# Patient Record
Sex: MALE | Race: WHITE | Employment: PART TIME | URBAN - METROPOLITAN AREA
[De-identification: names, ages, dates, MRNs, and addresses within clinical notes are randomized per-mention and may not be internally consistent; named-entity substitution may affect disease eponyms.]

---

## 2017-06-11 ENCOUNTER — APPOINTMENT (OUTPATIENT)
Dept: GENERAL RADIOLOGY | Age: 47
DRG: 247 | End: 2017-06-11
Payer: MEDICARE

## 2017-06-11 ENCOUNTER — HOSPITAL ENCOUNTER (INPATIENT)
Age: 47
LOS: 2 days | Discharge: HOME | DRG: 247 | End: 2017-06-13
Attending: EMERGENCY MEDICINE | Admitting: INTERNAL MEDICINE
Payer: MEDICARE

## 2017-06-11 ENCOUNTER — APPOINTMENT (OUTPATIENT)
Dept: CT IMAGING | Age: 47
DRG: 247 | End: 2017-06-11
Payer: MEDICARE

## 2017-06-11 DIAGNOSIS — I21.3 ST ELEVATION MYOCARDIAL INFARCTION (STEMI), UNSPECIFIED ARTERY (HCC): Primary | ICD-10-CM

## 2017-06-11 LAB
ANION GAP SERPL CALCULATED.3IONS-SCNC: 21 MMOL/L (ref 9–17)
BUN BLDV-MCNC: 20 MG/DL (ref 6–20)
BUN/CREAT BLD: ABNORMAL (ref 9–20)
CALCIUM SERPL-MCNC: 8.8 MG/DL (ref 8.6–10.4)
CHLORIDE BLD-SCNC: 102 MMOL/L (ref 98–107)
CHOLESTEROL/HDL RATIO: 3.6
CHOLESTEROL: 108 MG/DL
CO2: 18 MMOL/L (ref 20–31)
CREAT SERPL-MCNC: 1.36 MG/DL (ref 0.7–1.2)
GFR AFRICAN AMERICAN: >60 ML/MIN
GFR NON-AFRICAN AMERICAN: 56 ML/MIN
GFR SERPL CREATININE-BSD FRML MDRD: ABNORMAL ML/MIN/{1.73_M2}
GFR SERPL CREATININE-BSD FRML MDRD: ABNORMAL ML/MIN/{1.73_M2}
GLUCOSE BLD-MCNC: 168 MG/DL (ref 70–99)
HCT VFR BLD CALC: 44.6 % (ref 41–53)
HDLC SERPL-MCNC: 30 MG/DL
HEMOGLOBIN: 14.8 G/DL (ref 13.5–17.5)
LDL CHOLESTEROL: 62 MG/DL (ref 0–130)
MCH RBC QN AUTO: 29 PG (ref 26–34)
MCHC RBC AUTO-ENTMCNC: 33.3 G/DL (ref 31–37)
MCV RBC AUTO: 87.1 FL (ref 80–100)
PDW BLD-RTO: 14.2 % (ref 12.5–15.4)
PLATELET # BLD: 286 K/UL (ref 140–450)
PMV BLD AUTO: 8.6 FL (ref 6–12)
POTASSIUM SERPL-SCNC: 3.9 MMOL/L (ref 3.7–5.3)
RBC # BLD: 5.12 M/UL (ref 4.5–5.9)
SODIUM BLD-SCNC: 141 MMOL/L (ref 135–144)
TRIGL SERPL-MCNC: 78 MG/DL
TROPONIN INTERP: ABNORMAL
TROPONIN T: 4.72 NG/ML
VLDLC SERPL CALC-MCNC: ABNORMAL MG/DL (ref 1–30)
WBC # BLD: 16.3 K/UL (ref 3.5–11)

## 2017-06-11 PROCEDURE — 82803 BLOOD GASES ANY COMBINATION: CPT

## 2017-06-11 PROCEDURE — 85027 COMPLETE CBC AUTOMATED: CPT

## 2017-06-11 PROCEDURE — 82435 ASSAY OF BLOOD CHLORIDE: CPT

## 2017-06-11 PROCEDURE — 85347 COAGULATION TIME ACTIVATED: CPT

## 2017-06-11 PROCEDURE — 82330 ASSAY OF CALCIUM: CPT

## 2017-06-11 PROCEDURE — 84132 ASSAY OF SERUM POTASSIUM: CPT

## 2017-06-11 PROCEDURE — 82565 ASSAY OF CREATININE: CPT

## 2017-06-11 PROCEDURE — 99291 CRITICAL CARE FIRST HOUR: CPT

## 2017-06-11 PROCEDURE — 71010 XR CHEST PORTABLE: CPT

## 2017-06-11 PROCEDURE — 80048 BASIC METABOLIC PNL TOTAL CA: CPT

## 2017-06-11 PROCEDURE — 2580000003 HC RX 258: Performed by: INTERNAL MEDICINE

## 2017-06-11 PROCEDURE — 85014 HEMATOCRIT: CPT

## 2017-06-11 PROCEDURE — C1894 INTRO/SHEATH, NON-LASER: HCPCS

## 2017-06-11 PROCEDURE — 6370000000 HC RX 637 (ALT 250 FOR IP): Performed by: INTERNAL MEDICINE

## 2017-06-11 PROCEDURE — C1725 CATH, TRANSLUMIN NON-LASER: HCPCS

## 2017-06-11 PROCEDURE — 83605 ASSAY OF LACTIC ACID: CPT

## 2017-06-11 PROCEDURE — 84484 ASSAY OF TROPONIN QUANT: CPT

## 2017-06-11 PROCEDURE — C1876 STENT, NON-COA/NON-COV W/DEL: HCPCS

## 2017-06-11 PROCEDURE — 84295 ASSAY OF SERUM SODIUM: CPT

## 2017-06-11 PROCEDURE — 93458 L HRT ARTERY/VENTRICLE ANGIO: CPT | Performed by: INTERNAL MEDICINE

## 2017-06-11 PROCEDURE — 6360000002 HC RX W HCPCS: Performed by: INTERNAL MEDICINE

## 2017-06-11 PROCEDURE — C1760 CLOSURE DEV, VASC: HCPCS

## 2017-06-11 PROCEDURE — 80061 LIPID PANEL: CPT

## 2017-06-11 PROCEDURE — C1769 GUIDE WIRE: HCPCS

## 2017-06-11 PROCEDURE — 71275 CT ANGIOGRAPHY CHEST: CPT

## 2017-06-11 PROCEDURE — 92941 PRQ TRLML REVSC TOT OCCL AMI: CPT | Performed by: INTERNAL MEDICINE

## 2017-06-11 PROCEDURE — 6360000004 HC RX CONTRAST MEDICATION: Performed by: EMERGENCY MEDICINE

## 2017-06-11 PROCEDURE — C1757 CATH, THROMBECTOMY/EMBOLECT: HCPCS

## 2017-06-11 PROCEDURE — 82947 ASSAY GLUCOSE BLOOD QUANT: CPT

## 2017-06-11 PROCEDURE — 36415 COLL VENOUS BLD VENIPUNCTURE: CPT

## 2017-06-11 PROCEDURE — 93005 ELECTROCARDIOGRAM TRACING: CPT

## 2017-06-11 PROCEDURE — 2000000000 HC ICU R&B

## 2017-06-11 RX ORDER — MORPHINE SULFATE 2 MG/ML
2 INJECTION, SOLUTION INTRAMUSCULAR; INTRAVENOUS EVERY 4 HOURS PRN
Status: DISCONTINUED | OUTPATIENT
Start: 2017-06-11 | End: 2017-06-13 | Stop reason: HOSPADM

## 2017-06-11 RX ORDER — POTASSIUM CHLORIDE 20MEQ/15ML
40 LIQUID (ML) ORAL PRN
Status: DISCONTINUED | OUTPATIENT
Start: 2017-06-11 | End: 2017-06-13 | Stop reason: HOSPADM

## 2017-06-11 RX ORDER — SODIUM CHLORIDE 9 MG/ML
INJECTION, SOLUTION INTRAVENOUS CONTINUOUS
Status: DISCONTINUED | OUTPATIENT
Start: 2017-06-11 | End: 2017-06-12

## 2017-06-11 RX ORDER — SODIUM CHLORIDE 0.9 % (FLUSH) 0.9 %
10 SYRINGE (ML) INJECTION PRN
Status: DISCONTINUED | OUTPATIENT
Start: 2017-06-11 | End: 2017-06-13 | Stop reason: HOSPADM

## 2017-06-11 RX ORDER — LIDOCAINE HYDROCHLORIDE ANHYDROUS AND DEXTROSE MONOHYDRATE .4; 5 G/100ML; G/100ML
1 INJECTION, SOLUTION INTRAVENOUS CONTINUOUS
Status: DISCONTINUED | OUTPATIENT
Start: 2017-06-11 | End: 2017-06-12

## 2017-06-11 RX ORDER — ATORVASTATIN CALCIUM 80 MG/1
80 TABLET, FILM COATED ORAL NIGHTLY
Status: DISCONTINUED | OUTPATIENT
Start: 2017-06-11 | End: 2017-06-13 | Stop reason: HOSPADM

## 2017-06-11 RX ORDER — CARVEDILOL 3.12 MG/1
3.12 TABLET ORAL 2 TIMES DAILY WITH MEALS
Status: DISCONTINUED | OUTPATIENT
Start: 2017-06-11 | End: 2017-06-12

## 2017-06-11 RX ORDER — POTASSIUM CHLORIDE 20 MEQ/1
40 TABLET, EXTENDED RELEASE ORAL PRN
Status: DISCONTINUED | OUTPATIENT
Start: 2017-06-11 | End: 2017-06-13 | Stop reason: HOSPADM

## 2017-06-11 RX ORDER — ASPIRIN 81 MG/1
81 TABLET, CHEWABLE ORAL DAILY
Status: DISCONTINUED | OUTPATIENT
Start: 2017-06-12 | End: 2017-06-13 | Stop reason: HOSPADM

## 2017-06-11 RX ORDER — SODIUM CHLORIDE 0.9 % (FLUSH) 0.9 %
10 SYRINGE (ML) INJECTION EVERY 12 HOURS SCHEDULED
Status: DISCONTINUED | OUTPATIENT
Start: 2017-06-11 | End: 2017-06-13 | Stop reason: HOSPADM

## 2017-06-11 RX ORDER — NITROGLYCERIN 0.4 MG/1
0.4 TABLET SUBLINGUAL EVERY 5 MIN PRN
Status: DISCONTINUED | OUTPATIENT
Start: 2017-06-11 | End: 2017-06-13 | Stop reason: HOSPADM

## 2017-06-11 RX ORDER — ONDANSETRON 2 MG/ML
4 INJECTION INTRAMUSCULAR; INTRAVENOUS EVERY 6 HOURS PRN
Status: DISCONTINUED | OUTPATIENT
Start: 2017-06-11 | End: 2017-06-13 | Stop reason: HOSPADM

## 2017-06-11 RX ORDER — METOPROLOL SUCCINATE 25 MG/1
25 TABLET, EXTENDED RELEASE ORAL DAILY
Status: DISCONTINUED | OUTPATIENT
Start: 2017-06-11 | End: 2017-06-11

## 2017-06-11 RX ORDER — FAMOTIDINE 20 MG/1
20 TABLET, FILM COATED ORAL 2 TIMES DAILY
Status: DISCONTINUED | OUTPATIENT
Start: 2017-06-11 | End: 2017-06-13 | Stop reason: HOSPADM

## 2017-06-11 RX ORDER — MAGNESIUM SULFATE 1 G/100ML
1 INJECTION INTRAVENOUS PRN
Status: DISCONTINUED | OUTPATIENT
Start: 2017-06-11 | End: 2017-06-13 | Stop reason: HOSPADM

## 2017-06-11 RX ORDER — OXYCODONE HYDROCHLORIDE AND ACETAMINOPHEN 5; 325 MG/1; MG/1
1 TABLET ORAL EVERY 4 HOURS PRN
Status: DISCONTINUED | OUTPATIENT
Start: 2017-06-11 | End: 2017-06-13 | Stop reason: HOSPADM

## 2017-06-11 RX ORDER — POTASSIUM CHLORIDE 7.45 MG/ML
10 INJECTION INTRAVENOUS PRN
Status: DISCONTINUED | OUTPATIENT
Start: 2017-06-11 | End: 2017-06-13 | Stop reason: HOSPADM

## 2017-06-11 RX ORDER — ACETAMINOPHEN 325 MG/1
650 TABLET ORAL EVERY 4 HOURS PRN
Status: DISCONTINUED | OUTPATIENT
Start: 2017-06-11 | End: 2017-06-13 | Stop reason: HOSPADM

## 2017-06-11 RX ADMIN — OXYCODONE HYDROCHLORIDE AND ACETAMINOPHEN 1 TABLET: 5; 325 TABLET ORAL at 22:31

## 2017-06-11 RX ADMIN — FAMOTIDINE 20 MG: 20 TABLET, FILM COATED ORAL at 20:20

## 2017-06-11 RX ADMIN — LIDOCAINE HYDROCHLORIDE 1 MG/MIN: 4 INJECTION, SOLUTION INTRAVENOUS at 06:32

## 2017-06-11 RX ADMIN — IOVERSOL 100 ML: 741 INJECTION INTRA-ARTERIAL; INTRAVENOUS at 09:09

## 2017-06-11 RX ADMIN — CARVEDILOL 3.12 MG: 3.12 TABLET, FILM COATED ORAL at 09:57

## 2017-06-11 RX ADMIN — SODIUM CHLORIDE 75 ML/HR: 9 INJECTION, SOLUTION INTRAVENOUS at 06:31

## 2017-06-11 RX ADMIN — ENOXAPARIN SODIUM 40 MG: 40 INJECTION SUBCUTANEOUS at 10:00

## 2017-06-11 RX ADMIN — OXYCODONE HYDROCHLORIDE AND ACETAMINOPHEN 1 TABLET: 5; 325 TABLET ORAL at 16:39

## 2017-06-11 RX ADMIN — ATORVASTATIN CALCIUM 80 MG: 80 TABLET, FILM COATED ORAL at 20:20

## 2017-06-11 RX ADMIN — FAMOTIDINE 20 MG: 20 TABLET, FILM COATED ORAL at 10:00

## 2017-06-11 RX ADMIN — TIROFIBAN 0.15 MCG/KG/MIN: 5 INJECTION, SOLUTION INTRAVENOUS at 07:35

## 2017-06-11 RX ADMIN — TICAGRELOR 90 MG: 90 TABLET ORAL at 20:20

## 2017-06-11 RX ADMIN — OXYCODONE HYDROCHLORIDE AND ACETAMINOPHEN 1 TABLET: 5; 325 TABLET ORAL at 09:51

## 2017-06-11 RX ADMIN — CARVEDILOL 3.12 MG: 3.12 TABLET, FILM COATED ORAL at 20:20

## 2017-06-11 RX ADMIN — MORPHINE SULFATE 2 MG: 2 INJECTION, SOLUTION INTRAMUSCULAR; INTRAVENOUS at 06:55

## 2017-06-11 ASSESSMENT — PAIN SCALES - GENERAL
PAINLEVEL_OUTOF10: 9
PAINLEVEL_OUTOF10: 10
PAINLEVEL_OUTOF10: 7
PAINLEVEL_OUTOF10: 7

## 2017-06-11 ASSESSMENT — PAIN DESCRIPTION - ORIENTATION: ORIENTATION: MID

## 2017-06-11 ASSESSMENT — PAIN DESCRIPTION - LOCATION: LOCATION: CHEST

## 2017-06-11 ASSESSMENT — PAIN DESCRIPTION - PAIN TYPE: TYPE: ACUTE PAIN

## 2017-06-11 ASSESSMENT — PAIN DESCRIPTION - FREQUENCY: FREQUENCY: CONTINUOUS

## 2017-06-11 ASSESSMENT — PAIN DESCRIPTION - PROGRESSION: CLINICAL_PROGRESSION: GRADUALLY WORSENING

## 2017-06-11 ASSESSMENT — PAIN DESCRIPTION - DESCRIPTORS: DESCRIPTORS: ACHING;CONSTANT

## 2017-06-12 PROBLEM — I25.10 CORONARY ARTERY DISEASE INVOLVING NATIVE CORONARY ARTERY: Chronic | Status: ACTIVE | Noted: 2017-06-12

## 2017-06-12 PROBLEM — Z72.0 TOBACCO ABUSE: Chronic | Status: ACTIVE | Noted: 2017-06-12

## 2017-06-12 PROBLEM — I21.02 ST ELEVATION (STEMI) MYOCARDIAL INFARCTION INVOLVING LEFT ANTERIOR DESCENDING CORONARY ARTERY (HCC): Status: ACTIVE | Noted: 2017-06-12

## 2017-06-12 PROBLEM — E66.9 OBESITY (BMI 30-39.9): Chronic | Status: ACTIVE | Noted: 2017-06-12

## 2017-06-12 PROBLEM — Z98.61 S/P PTCA (PERCUTANEOUS TRANSLUMINAL CORONARY ANGIOPLASTY): Status: ACTIVE | Noted: 2017-06-12

## 2017-06-12 PROBLEM — F14.10 COCAINE ABUSE (HCC): Status: ACTIVE | Noted: 2017-06-12

## 2017-06-12 PROBLEM — F14.10 COCAINE ABUSE (HCC): Chronic | Status: ACTIVE | Noted: 2017-06-12

## 2017-06-12 LAB
ACTIVATED CLOTTING TIME: 140 SEC (ref 79–149)
ACTIVATED CLOTTING TIME: 156 SEC (ref 79–149)
ALBUMIN SERPL-MCNC: 3.3 G/DL (ref 3.5–5.2)
ALBUMIN/GLOBULIN RATIO: 1.4 (ref 1–2.5)
ALLEN TEST: ABNORMAL
ALP BLD-CCNC: 64 U/L (ref 40–129)
ALT SERPL-CCNC: 28 U/L (ref 5–41)
AMPHETAMINE SCREEN URINE: NEGATIVE
ANION GAP SERPL CALCULATED.3IONS-SCNC: 14 MMOL/L (ref 9–17)
ANION GAP: 14 MMOL/L (ref 7–16)
AST SERPL-CCNC: 108 U/L
BARBITURATE SCREEN URINE: NEGATIVE
BENZODIAZEPINE SCREEN, URINE: NEGATIVE
BILIRUB SERPL-MCNC: <0.1 MG/DL (ref 0.3–1.2)
BILIRUBIN DIRECT: <0.08 MG/DL
BILIRUBIN, INDIRECT: ABNORMAL MG/DL (ref 0–1)
BNP INTERPRETATION: ABNORMAL
BUN BLDV-MCNC: 9 MG/DL (ref 6–20)
BUN/CREAT BLD: ABNORMAL (ref 9–20)
BUPRENORPHINE URINE: ABNORMAL
CALCIUM SERPL-MCNC: 8.6 MG/DL (ref 8.6–10.4)
CANNABINOID SCREEN URINE: NEGATIVE
CHLORIDE BLD-SCNC: 108 MMOL/L (ref 98–107)
CO2: 21 MMOL/L (ref 20–31)
COCAINE METABOLITE, URINE: POSITIVE
CREAT SERPL-MCNC: 0.81 MG/DL (ref 0.7–1.2)
EKG ATRIAL RATE: 77 BPM
EKG P AXIS: 32 DEGREES
EKG P-R INTERVAL: 138 MS
EKG Q-T INTERVAL: 384 MS
EKG QRS DURATION: 80 MS
EKG QTC CALCULATION (BAZETT): 434 MS
EKG R AXIS: -22 DEGREES
EKG T AXIS: -47 DEGREES
EKG VENTRICULAR RATE: 77 BPM
ESTIMATED AVERAGE GLUCOSE: 134 MG/DL
FIO2: ABNORMAL
GFR AFRICAN AMERICAN: >60 ML/MIN
GFR NON-AFRICAN AMERICAN: >60 ML/MIN
GFR NON-AFRICAN AMERICAN: NORMAL ML/MIN
GFR SERPL CREATININE-BSD FRML MDRD: ABNORMAL ML/MIN/{1.73_M2}
GFR SERPL CREATININE-BSD FRML MDRD: ABNORMAL ML/MIN/{1.73_M2}
GFR SERPL CREATININE-BSD FRML MDRD: NORMAL ML/MIN
GFR SERPL CREATININE-BSD FRML MDRD: NORMAL ML/MIN/{1.73_M2}
GLOBULIN: ABNORMAL G/DL (ref 1.5–3.8)
GLUCOSE BLD-MCNC: 147 MG/DL (ref 75–110)
GLUCOSE BLD-MCNC: 161 MG/DL (ref 75–110)
GLUCOSE BLD-MCNC: 161 MG/DL (ref 75–110)
GLUCOSE BLD-MCNC: 180 MG/DL (ref 74–100)
GLUCOSE BLD-MCNC: 180 MG/DL (ref 75–110)
GLUCOSE BLD-MCNC: 210 MG/DL (ref 70–99)
HBA1C MFR BLD: 6.3 % (ref 4–6)
HCO3 VENOUS: 19.1 MMOL/L (ref 22–29)
HCT VFR BLD CALC: 39.8 % (ref 41–53)
HEMOGLOBIN: 13.3 G/DL (ref 13.5–17.5)
LV EF: 53 %
LVEF MODALITY: NORMAL
MCH RBC QN AUTO: 28.8 PG (ref 26–34)
MCHC RBC AUTO-ENTMCNC: 33.3 G/DL (ref 31–37)
MCV RBC AUTO: 86.6 FL (ref 80–100)
MDMA URINE: ABNORMAL
METHADONE SCREEN, URINE: NEGATIVE
METHAMPHETAMINE, URINE: ABNORMAL
MODE: ABNORMAL
NEGATIVE BASE EXCESS, VEN: 3 (ref 0–2)
O2 DEVICE/FLOW/%: ABNORMAL
O2 SAT, VEN: 68 % (ref 60–85)
OPIATES, URINE: NEGATIVE
OXYCODONE SCREEN URINE: POSITIVE
PATIENT TEMP: ABNORMAL
PCO2, VEN: 28 MM HG (ref 41–51)
PDW BLD-RTO: 14.5 % (ref 12.5–15.4)
PH VENOUS: 7.44 (ref 7.32–7.43)
PHENCYCLIDINE, URINE: NEGATIVE
PLATELET # BLD: 253 K/UL (ref 140–450)
PMV BLD AUTO: 8.1 FL (ref 6–12)
PO2, VEN: 33.1 MM HG (ref 30–50)
POC CHLORIDE: 107 MMOL/L (ref 98–107)
POC CREATININE: 1.16 MG/DL (ref 0.51–1.19)
POC HEMATOCRIT: 46 % (ref 41–53)
POC HEMOGLOBIN: 15.8 G/DL (ref 13.5–17.5)
POC IONIZED CALCIUM: 1.06 MMOL/L (ref 1.15–1.33)
POC LACTIC ACID: 5.32 MMOL/L (ref 0.56–1.39)
POC PCO2 TEMP: ABNORMAL MM HG
POC PH TEMP: ABNORMAL
POC PO2 TEMP: ABNORMAL MM HG
POC POTASSIUM: 3.6 MMOL/L (ref 3.5–4.5)
POC SODIUM: 140 MMOL/L (ref 138–146)
POC TROPONIN I: 0.03 NG/ML (ref 0–0.1)
POC TROPONIN INTERP: NORMAL
POSITIVE BASE EXCESS, VEN: ABNORMAL (ref 0–3)
POTASSIUM SERPL-SCNC: 3.7 MMOL/L (ref 3.7–5.3)
PRO-BNP: 799 PG/ML
PROPOXYPHENE, URINE: ABNORMAL
RBC # BLD: 4.6 M/UL (ref 4.5–5.9)
SAMPLE SITE: ABNORMAL
SODIUM BLD-SCNC: 143 MMOL/L (ref 135–144)
TEST INFORMATION: ABNORMAL
TOTAL CO2, VENOUS: 20 MMOL/L (ref 23–30)
TOTAL PROTEIN: 5.7 G/DL (ref 6.4–8.3)
TRICYCLIC ANTIDEPRESSANTS, UR: ABNORMAL
TROPONIN INTERP: ABNORMAL
TROPONIN T: 1.94 NG/ML
TROPONIN T: 2.52 NG/ML
TROPONIN T: 2.61 NG/ML
TSH SERPL DL<=0.05 MIU/L-ACNC: 2.23 MIU/L (ref 0.3–5)
WBC # BLD: 11.1 K/UL (ref 3.5–11)

## 2017-06-12 PROCEDURE — 6370000000 HC RX 637 (ALT 250 FOR IP): Performed by: INTERNAL MEDICINE

## 2017-06-12 PROCEDURE — 83036 HEMOGLOBIN GLYCOSYLATED A1C: CPT

## 2017-06-12 PROCEDURE — 2060000000 HC ICU INTERMEDIATE R&B

## 2017-06-12 PROCEDURE — 93458 L HRT ARTERY/VENTRICLE ANGIO: CPT | Performed by: INTERNAL MEDICINE

## 2017-06-12 PROCEDURE — 85027 COMPLETE CBC AUTOMATED: CPT

## 2017-06-12 PROCEDURE — 027034Z DILATION OF CORONARY ARTERY, ONE ARTERY WITH DRUG-ELUTING INTRALUMINAL DEVICE, PERCUTANEOUS APPROACH: ICD-10-PCS | Performed by: INTERNAL MEDICINE

## 2017-06-12 PROCEDURE — B2151ZZ FLUOROSCOPY OF LEFT HEART USING LOW OSMOLAR CONTRAST: ICD-10-PCS | Performed by: INTERNAL MEDICINE

## 2017-06-12 PROCEDURE — 36415 COLL VENOUS BLD VENIPUNCTURE: CPT

## 2017-06-12 PROCEDURE — 84443 ASSAY THYROID STIM HORMONE: CPT

## 2017-06-12 PROCEDURE — 93306 TTE W/DOPPLER COMPLETE: CPT

## 2017-06-12 PROCEDURE — B2111ZZ FLUOROSCOPY OF MULTIPLE CORONARY ARTERIES USING LOW OSMOLAR CONTRAST: ICD-10-PCS | Performed by: INTERNAL MEDICINE

## 2017-06-12 PROCEDURE — 6360000002 HC RX W HCPCS: Performed by: INTERNAL MEDICINE

## 2017-06-12 PROCEDURE — 4A023N7 MEASUREMENT OF CARDIAC SAMPLING AND PRESSURE, LEFT HEART, PERCUTANEOUS APPROACH: ICD-10-PCS | Performed by: INTERNAL MEDICINE

## 2017-06-12 PROCEDURE — 93005 ELECTROCARDIOGRAM TRACING: CPT

## 2017-06-12 PROCEDURE — 80307 DRUG TEST PRSMV CHEM ANLYZR: CPT

## 2017-06-12 PROCEDURE — 80048 BASIC METABOLIC PNL TOTAL CA: CPT

## 2017-06-12 PROCEDURE — 92941 PRQ TRLML REVSC TOT OCCL AMI: CPT | Performed by: INTERNAL MEDICINE

## 2017-06-12 PROCEDURE — 83880 ASSAY OF NATRIURETIC PEPTIDE: CPT

## 2017-06-12 PROCEDURE — 80076 HEPATIC FUNCTION PANEL: CPT

## 2017-06-12 PROCEDURE — 82947 ASSAY GLUCOSE BLOOD QUANT: CPT

## 2017-06-12 PROCEDURE — 84484 ASSAY OF TROPONIN QUANT: CPT

## 2017-06-12 RX ORDER — CARVEDILOL 6.25 MG/1
6.25 TABLET ORAL 2 TIMES DAILY WITH MEALS
Status: DISCONTINUED | OUTPATIENT
Start: 2017-06-12 | End: 2017-06-13 | Stop reason: HOSPADM

## 2017-06-12 RX ORDER — DEXTROSE MONOHYDRATE 50 MG/ML
100 INJECTION, SOLUTION INTRAVENOUS PRN
Status: DISCONTINUED | OUTPATIENT
Start: 2017-06-12 | End: 2017-06-13 | Stop reason: HOSPADM

## 2017-06-12 RX ORDER — NICOTINE POLACRILEX 4 MG
15 LOZENGE BUCCAL PRN
Status: DISCONTINUED | OUTPATIENT
Start: 2017-06-12 | End: 2017-06-13 | Stop reason: HOSPADM

## 2017-06-12 RX ORDER — DEXTROSE MONOHYDRATE 25 G/50ML
12.5 INJECTION, SOLUTION INTRAVENOUS PRN
Status: DISCONTINUED | OUTPATIENT
Start: 2017-06-12 | End: 2017-06-13 | Stop reason: HOSPADM

## 2017-06-12 RX ADMIN — ASPIRIN 81 MG: 81 TABLET, CHEWABLE ORAL at 10:16

## 2017-06-12 RX ADMIN — ATORVASTATIN CALCIUM 80 MG: 80 TABLET, FILM COATED ORAL at 19:45

## 2017-06-12 RX ADMIN — INSULIN LISPRO 1 UNITS: 100 INJECTION, SOLUTION INTRAVENOUS; SUBCUTANEOUS at 12:40

## 2017-06-12 RX ADMIN — INSULIN LISPRO 1 UNITS: 100 INJECTION, SOLUTION INTRAVENOUS; SUBCUTANEOUS at 16:38

## 2017-06-12 RX ADMIN — CARVEDILOL 3.12 MG: 3.12 TABLET, FILM COATED ORAL at 10:16

## 2017-06-12 RX ADMIN — ENOXAPARIN SODIUM 40 MG: 40 INJECTION SUBCUTANEOUS at 10:16

## 2017-06-12 RX ADMIN — FAMOTIDINE 20 MG: 20 TABLET, FILM COATED ORAL at 10:16

## 2017-06-12 RX ADMIN — FAMOTIDINE 20 MG: 20 TABLET, FILM COATED ORAL at 19:45

## 2017-06-12 RX ADMIN — CARVEDILOL 6.25 MG: 6.25 TABLET, FILM COATED ORAL at 16:38

## 2017-06-12 RX ADMIN — TICAGRELOR 90 MG: 90 TABLET ORAL at 19:45

## 2017-06-12 RX ADMIN — TICAGRELOR 90 MG: 90 TABLET ORAL at 10:16

## 2017-06-13 VITALS
HEART RATE: 72 BPM | TEMPERATURE: 97.7 F | DIASTOLIC BLOOD PRESSURE: 63 MMHG | SYSTOLIC BLOOD PRESSURE: 126 MMHG | HEIGHT: 72 IN | WEIGHT: 266.8 LBS | RESPIRATION RATE: 16 BRPM | OXYGEN SATURATION: 98 % | BODY MASS INDEX: 36.14 KG/M2

## 2017-06-13 LAB
ABSOLUTE EOS #: 0.3 K/UL (ref 0–0.4)
ABSOLUTE LYMPH #: 2.5 K/UL (ref 1–4.8)
ABSOLUTE MONO #: 1 K/UL (ref 0.1–1.2)
ALBUMIN SERPL-MCNC: 3.6 G/DL (ref 3.5–5.2)
ALBUMIN/GLOBULIN RATIO: 1.4 (ref 1–2.5)
ALP BLD-CCNC: 65 U/L (ref 40–129)
ALT SERPL-CCNC: 25 U/L (ref 5–41)
ANION GAP SERPL CALCULATED.3IONS-SCNC: 13 MMOL/L (ref 9–17)
AST SERPL-CCNC: 44 U/L
BASOPHILS # BLD: 0 %
BASOPHILS ABSOLUTE: 0 K/UL (ref 0–0.2)
BILIRUB SERPL-MCNC: 0.2 MG/DL (ref 0.3–1.2)
BILIRUBIN DIRECT: <0.08 MG/DL
BILIRUBIN, INDIRECT: ABNORMAL MG/DL (ref 0–1)
BUN BLDV-MCNC: 8 MG/DL (ref 6–20)
BUN/CREAT BLD: ABNORMAL (ref 9–20)
CALCIUM SERPL-MCNC: 9 MG/DL (ref 8.6–10.4)
CHLORIDE BLD-SCNC: 107 MMOL/L (ref 98–107)
CO2: 24 MMOL/L (ref 20–31)
CREAT SERPL-MCNC: 0.82 MG/DL (ref 0.7–1.2)
DIFFERENTIAL TYPE: ABNORMAL
EKG ATRIAL RATE: 74 BPM
EKG ATRIAL RATE: 75 BPM
EKG P AXIS: 37 DEGREES
EKG P AXIS: 49 DEGREES
EKG P-R INTERVAL: 146 MS
EKG P-R INTERVAL: 150 MS
EKG Q-T INTERVAL: 360 MS
EKG Q-T INTERVAL: 372 MS
EKG QRS DURATION: 84 MS
EKG QRS DURATION: 92 MS
EKG QTC CALCULATION (BAZETT): 399 MS
EKG QTC CALCULATION (BAZETT): 415 MS
EKG R AXIS: -25 DEGREES
EKG R AXIS: 32 DEGREES
EKG T AXIS: 112 DEGREES
EKG T AXIS: 91 DEGREES
EKG VENTRICULAR RATE: 74 BPM
EKG VENTRICULAR RATE: 75 BPM
EOSINOPHILS RELATIVE PERCENT: 3 %
GFR AFRICAN AMERICAN: >60 ML/MIN
GFR NON-AFRICAN AMERICAN: >60 ML/MIN
GFR SERPL CREATININE-BSD FRML MDRD: ABNORMAL ML/MIN/{1.73_M2}
GFR SERPL CREATININE-BSD FRML MDRD: ABNORMAL ML/MIN/{1.73_M2}
GLOBULIN: ABNORMAL G/DL (ref 1.5–3.8)
GLUCOSE BLD-MCNC: 164 MG/DL (ref 75–110)
GLUCOSE BLD-MCNC: 200 MG/DL (ref 75–110)
GLUCOSE BLD-MCNC: 206 MG/DL (ref 70–99)
HCT VFR BLD CALC: 41.5 % (ref 41–53)
HEMOGLOBIN: 13.7 G/DL (ref 13.5–17.5)
LYMPHOCYTES # BLD: 22 %
MCH RBC QN AUTO: 28.8 PG (ref 26–34)
MCHC RBC AUTO-ENTMCNC: 33 G/DL (ref 31–37)
MCV RBC AUTO: 87.2 FL (ref 80–100)
MONOCYTES # BLD: 9 %
PDW BLD-RTO: 14.9 % (ref 12.5–15.4)
PLATELET # BLD: 257 K/UL (ref 140–450)
PLATELET ESTIMATE: ABNORMAL
PMV BLD AUTO: 8.1 FL (ref 6–12)
POTASSIUM SERPL-SCNC: 3.5 MMOL/L (ref 3.7–5.3)
RBC # BLD: 4.76 M/UL (ref 4.5–5.9)
RBC # BLD: ABNORMAL 10*6/UL
SEG NEUTROPHILS: 66 %
SEGMENTED NEUTROPHILS ABSOLUTE COUNT: 7.4 K/UL (ref 1.8–7.7)
SODIUM BLD-SCNC: 144 MMOL/L (ref 135–144)
TOTAL PROTEIN: 6.1 G/DL (ref 6.4–8.3)
TROPONIN INTERP: ABNORMAL
TROPONIN INTERP: ABNORMAL
TROPONIN T: 2.17 NG/ML
TROPONIN T: 2.21 NG/ML
WBC # BLD: 11.2 K/UL (ref 3.5–11)
WBC # BLD: ABNORMAL 10*3/UL

## 2017-06-13 PROCEDURE — 80048 BASIC METABOLIC PNL TOTAL CA: CPT

## 2017-06-13 PROCEDURE — 6370000000 HC RX 637 (ALT 250 FOR IP): Performed by: INTERNAL MEDICINE

## 2017-06-13 PROCEDURE — 6360000002 HC RX W HCPCS: Performed by: INTERNAL MEDICINE

## 2017-06-13 PROCEDURE — 6370000000 HC RX 637 (ALT 250 FOR IP): Performed by: NURSE PRACTITIONER

## 2017-06-13 PROCEDURE — 2580000003 HC RX 258: Performed by: INTERNAL MEDICINE

## 2017-06-13 PROCEDURE — 85025 COMPLETE CBC W/AUTO DIFF WBC: CPT

## 2017-06-13 PROCEDURE — 36415 COLL VENOUS BLD VENIPUNCTURE: CPT

## 2017-06-13 PROCEDURE — 84484 ASSAY OF TROPONIN QUANT: CPT

## 2017-06-13 PROCEDURE — 82947 ASSAY GLUCOSE BLOOD QUANT: CPT

## 2017-06-13 PROCEDURE — 93005 ELECTROCARDIOGRAM TRACING: CPT

## 2017-06-13 PROCEDURE — 80076 HEPATIC FUNCTION PANEL: CPT

## 2017-06-13 RX ORDER — ATORVASTATIN CALCIUM 80 MG/1
80 TABLET, FILM COATED ORAL NIGHTLY
Qty: 30 TABLET | Refills: 3 | Status: ON HOLD | OUTPATIENT
Start: 2017-06-13 | End: 2018-11-19

## 2017-06-13 RX ORDER — CARVEDILOL 6.25 MG/1
6.25 TABLET ORAL 2 TIMES DAILY WITH MEALS
Qty: 60 TABLET | Refills: 3 | Status: ON HOLD | OUTPATIENT
Start: 2017-06-13 | End: 2018-11-19

## 2017-06-13 RX ORDER — OXYCODONE HYDROCHLORIDE AND ACETAMINOPHEN 5; 325 MG/1; MG/1
1 TABLET ORAL EVERY 4 HOURS PRN
Qty: 10 TABLET | Refills: 0 | Status: SHIPPED | OUTPATIENT
Start: 2017-06-13 | End: 2017-06-20

## 2017-06-13 RX ORDER — LISINOPRIL 2.5 MG/1
2.5 TABLET ORAL DAILY
Status: DISCONTINUED | OUTPATIENT
Start: 2017-06-13 | End: 2017-06-13 | Stop reason: HOSPADM

## 2017-06-13 RX ORDER — FAMOTIDINE 20 MG/1
20 TABLET, FILM COATED ORAL 2 TIMES DAILY
Qty: 60 TABLET | Refills: 3 | Status: SHIPPED | OUTPATIENT
Start: 2017-06-13 | End: 2017-12-07

## 2017-06-13 RX ORDER — LISINOPRIL 5 MG/1
2.5 TABLET ORAL DAILY
Qty: 30 TABLET | Refills: 3 | Status: ON HOLD | OUTPATIENT
Start: 2017-06-13 | End: 2018-11-19

## 2017-06-13 RX ORDER — NITROGLYCERIN 0.4 MG/1
TABLET SUBLINGUAL
Qty: 25 TABLET | Refills: 3 | Status: ON HOLD | OUTPATIENT
Start: 2017-06-13 | End: 2018-11-19

## 2017-06-13 RX ORDER — ASPIRIN 81 MG/1
81 TABLET, CHEWABLE ORAL DAILY
Qty: 30 TABLET | Refills: 3 | Status: ON HOLD | OUTPATIENT
Start: 2017-06-13 | End: 2018-11-19

## 2017-06-13 RX ADMIN — TICAGRELOR 90 MG: 90 TABLET ORAL at 08:21

## 2017-06-13 RX ADMIN — POTASSIUM CHLORIDE 40 MEQ: 40 SOLUTION ORAL at 06:34

## 2017-06-13 RX ADMIN — ENOXAPARIN SODIUM 40 MG: 40 INJECTION SUBCUTANEOUS at 08:22

## 2017-06-13 RX ADMIN — FAMOTIDINE 20 MG: 20 TABLET, FILM COATED ORAL at 08:22

## 2017-06-13 RX ADMIN — Medication 10 ML: at 08:22

## 2017-06-13 RX ADMIN — ASPIRIN 81 MG: 81 TABLET, CHEWABLE ORAL at 08:22

## 2017-06-13 RX ADMIN — LISINOPRIL 2.5 MG: 2.5 TABLET ORAL at 11:31

## 2017-06-13 RX ADMIN — INSULIN LISPRO 1 UNITS: 100 INJECTION, SOLUTION INTRAVENOUS; SUBCUTANEOUS at 08:22

## 2017-06-13 RX ADMIN — CARVEDILOL 6.25 MG: 6.25 TABLET, FILM COATED ORAL at 08:21

## 2017-06-13 RX ADMIN — INSULIN LISPRO 2 UNITS: 100 INJECTION, SOLUTION INTRAVENOUS; SUBCUTANEOUS at 11:31

## 2017-06-13 RX ADMIN — POTASSIUM CHLORIDE 40 MEQ: 20 TABLET, EXTENDED RELEASE ORAL at 10:10

## 2017-06-15 VITALS
TEMPERATURE: 98.6 F | SYSTOLIC BLOOD PRESSURE: 110 MMHG | HEIGHT: 72 IN | BODY MASS INDEX: 33.86 KG/M2 | HEART RATE: 86 BPM | WEIGHT: 250 LBS | OXYGEN SATURATION: 98 % | RESPIRATION RATE: 20 BRPM | DIASTOLIC BLOOD PRESSURE: 61 MMHG

## 2017-06-15 ASSESSMENT — PAIN DESCRIPTION - ORIENTATION: ORIENTATION: LEFT

## 2017-06-15 ASSESSMENT — PAIN DESCRIPTION - DESCRIPTORS: DESCRIPTORS: SHARP;CONSTANT

## 2017-06-15 ASSESSMENT — PAIN SCALES - GENERAL: PAINLEVEL_OUTOF10: 7

## 2017-06-15 ASSESSMENT — PAIN DESCRIPTION - FREQUENCY: FREQUENCY: CONTINUOUS

## 2017-06-15 ASSESSMENT — PAIN DESCRIPTION - PAIN TYPE: TYPE: ACUTE PAIN

## 2017-06-16 ENCOUNTER — HOSPITAL ENCOUNTER (EMERGENCY)
Age: 47
Discharge: HOME | End: 2017-06-16
Attending: EMERGENCY MEDICINE
Payer: MEDICARE

## 2017-06-16 DIAGNOSIS — S80.11XD HEMATOMA OF LEG, RIGHT, SUBSEQUENT ENCOUNTER: Primary | ICD-10-CM

## 2017-06-16 LAB
GFR NON-AFRICAN AMERICAN: >60 ML/MIN
GFR SERPL CREATININE-BSD FRML MDRD: >60 ML/MIN
GFR SERPL CREATININE-BSD FRML MDRD: NORMAL ML/MIN/{1.73_M2}
GLUCOSE BLD-MCNC: 194 MG/DL (ref 74–100)
POC CHLORIDE: 105 MMOL/L (ref 98–107)
POC CREATININE: 1.04 MG/DL (ref 0.51–1.19)
POC HEMATOCRIT: 46 % (ref 41–53)
POC HEMOGLOBIN: 15.8 G/DL (ref 13.5–17.5)
POC IONIZED CALCIUM: 1.01 MMOL/L (ref 1.15–1.33)
POC LACTIC ACID: 1.53 MMOL/L (ref 0.56–1.39)
POC POTASSIUM: 3.6 MMOL/L (ref 3.5–4.5)
POC SODIUM: 138 MMOL/L (ref 138–146)
POC TROPONIN I: 2.36 NG/ML (ref 0–0.1)
POC TROPONIN INTERP: ABNORMAL

## 2017-06-16 PROCEDURE — 82565 ASSAY OF CREATININE: CPT

## 2017-06-16 PROCEDURE — 82435 ASSAY OF BLOOD CHLORIDE: CPT

## 2017-06-16 PROCEDURE — 84132 ASSAY OF SERUM POTASSIUM: CPT

## 2017-06-16 PROCEDURE — 93005 ELECTROCARDIOGRAM TRACING: CPT

## 2017-06-16 PROCEDURE — 99283 EMERGENCY DEPT VISIT LOW MDM: CPT

## 2017-06-16 PROCEDURE — 84484 ASSAY OF TROPONIN QUANT: CPT

## 2017-06-16 PROCEDURE — 85014 HEMATOCRIT: CPT

## 2017-06-16 PROCEDURE — 83605 ASSAY OF LACTIC ACID: CPT

## 2017-06-16 PROCEDURE — 82330 ASSAY OF CALCIUM: CPT

## 2017-06-16 PROCEDURE — 84295 ASSAY OF SERUM SODIUM: CPT

## 2017-06-16 PROCEDURE — 82947 ASSAY GLUCOSE BLOOD QUANT: CPT

## 2017-06-16 ASSESSMENT — ENCOUNTER SYMPTOMS
ABDOMINAL PAIN: 0
TROUBLE SWALLOWING: 0
DIARRHEA: 0
VOMITING: 0
NAUSEA: 0
CONSTIPATION: 0
SHORTNESS OF BREATH: 0
BACK PAIN: 0
RHINORRHEA: 0

## 2017-06-19 LAB
EKG ATRIAL RATE: 81 BPM
EKG P AXIS: 43 DEGREES
EKG P-R INTERVAL: 140 MS
EKG Q-T INTERVAL: 354 MS
EKG QRS DURATION: 86 MS
EKG QTC CALCULATION (BAZETT): 411 MS
EKG R AXIS: -2 DEGREES
EKG T AXIS: -15 DEGREES
EKG VENTRICULAR RATE: 81 BPM

## 2017-06-27 LAB
EKG ATRIAL RATE: 77 BPM
EKG P AXIS: 32 DEGREES
EKG P-R INTERVAL: 138 MS
EKG Q-T INTERVAL: 384 MS
EKG QRS DURATION: 80 MS
EKG QTC CALCULATION (BAZETT): 434 MS
EKG R AXIS: -22 DEGREES
EKG T AXIS: -47 DEGREES
EKG VENTRICULAR RATE: 77 BPM

## 2017-12-07 ENCOUNTER — APPOINTMENT (OUTPATIENT)
Dept: GENERAL RADIOLOGY | Age: 47
End: 2017-12-07

## 2017-12-07 ENCOUNTER — HOSPITAL ENCOUNTER (EMERGENCY)
Age: 47
Discharge: HOME | End: 2017-12-07
Attending: EMERGENCY MEDICINE

## 2017-12-07 VITALS
OXYGEN SATURATION: 98 % | RESPIRATION RATE: 15 BRPM | DIASTOLIC BLOOD PRESSURE: 82 MMHG | BODY MASS INDEX: 36.62 KG/M2 | WEIGHT: 270 LBS | HEART RATE: 84 BPM | SYSTOLIC BLOOD PRESSURE: 160 MMHG | TEMPERATURE: 97.6 F

## 2017-12-07 DIAGNOSIS — M25.511 RIGHT SHOULDER PAIN, UNSPECIFIED CHRONICITY: Primary | ICD-10-CM

## 2017-12-07 PROCEDURE — 73030 X-RAY EXAM OF SHOULDER: CPT

## 2017-12-07 PROCEDURE — 99283 EMERGENCY DEPT VISIT LOW MDM: CPT

## 2017-12-07 PROCEDURE — 6370000000 HC RX 637 (ALT 250 FOR IP): Performed by: NURSE PRACTITIONER

## 2017-12-07 RX ORDER — CLOPIDOGREL BISULFATE 75 MG/1
75 TABLET ORAL DAILY
Status: ON HOLD | COMMUNITY
End: 2018-11-19

## 2017-12-07 RX ORDER — NAPROXEN 500 MG/1
500 TABLET ORAL 2 TIMES DAILY
Qty: 20 TABLET | Refills: 0 | Status: ON HOLD | OUTPATIENT
Start: 2017-12-07 | End: 2018-11-19

## 2017-12-07 RX ORDER — CYCLOBENZAPRINE HCL 10 MG
10 TABLET ORAL 3 TIMES DAILY PRN
Qty: 15 TABLET | Refills: 0 | Status: ON HOLD | OUTPATIENT
Start: 2017-12-07 | End: 2018-11-19 | Stop reason: HOSPADM

## 2017-12-07 RX ORDER — HYDROCODONE BITARTRATE AND ACETAMINOPHEN 5; 325 MG/1; MG/1
1 TABLET ORAL ONCE
Status: COMPLETED | OUTPATIENT
Start: 2017-12-07 | End: 2017-12-07

## 2017-12-07 RX ADMIN — HYDROCODONE BITARTRATE AND ACETAMINOPHEN 1 TABLET: 5; 325 TABLET ORAL at 22:16

## 2017-12-07 ASSESSMENT — PAIN DESCRIPTION - ORIENTATION: ORIENTATION: RIGHT

## 2017-12-07 ASSESSMENT — ENCOUNTER SYMPTOMS
TROUBLE SWALLOWING: 0
NAUSEA: 0
COUGH: 0
VOMITING: 0
SHORTNESS OF BREATH: 0

## 2017-12-07 ASSESSMENT — PAIN DESCRIPTION - LOCATION: LOCATION: SHOULDER

## 2017-12-07 ASSESSMENT — PAIN DESCRIPTION - PAIN TYPE: TYPE: ACUTE PAIN

## 2017-12-07 ASSESSMENT — PAIN SCALES - GENERAL
PAINLEVEL_OUTOF10: 9
PAINLEVEL_OUTOF10: 5

## 2017-12-08 NOTE — ED PROVIDER NOTES
2017    Hyperlipidemia     Hypertension      Reviewed. SURGICAL HISTORY           Procedure Laterality Date    CARDIAC CATHETERIZATION      ENDOSCOPY, COLON, DIAGNOSTIC      EGD    KNEE ARTHROSCOPY Right     at age 13 or 12     Reviewed. CURRENT MEDICATIONS       Previous Medications    ASPIRIN 81 MG CHEWABLE TABLET    Take 1 tablet by mouth daily    ATORVASTATIN (LIPITOR) 80 MG TABLET    Take 1 tablet by mouth nightly    CARVEDILOL (COREG) 6.25 MG TABLET    Take 1 tablet by mouth 2 times daily (with meals)    CLOPIDOGREL (PLAVIX) 75 MG TABLET    Take 75 mg by mouth daily    LISINOPRIL (PRINIVIL;ZESTRIL) 5 MG TABLET    Take 0.5 tablets by mouth daily    NITROGLYCERIN (NITROSTAT) 0.4 MG SL TABLET    up to max of 3 total doses. If no relief after 1 dose, call 911. ONDANSETRON (ZOFRAN) 4 MG TABLET    Take 1 tablet by mouth every 8 hours as needed for Nausea. ALLERGIES     Review of patient's allergies indicates no known allergies. FAMILY HISTORY           Problem Relation Age of Onset    Diabetes Mother     High Blood Pressure Mother     Heart Disease Mother     Coronary Art Dis Mother     Other Father      Family Status   Relation Status    Mother     Father     MVA    Sister Alive      Reviewed and not relevant. SOCIAL HISTORY      reports that he has quit smoking. His smoking use included Cigarettes. He does not have any smokeless tobacco history on file. He reports that he drinks alcohol. He reports that he does not use drugs. Reviewed. PHYSICAL EXAM    (up to 7 for level 4, 8 or more for level 5)     ED Triage Vitals [17 2131]   BP Temp Temp Source Pulse Resp SpO2 Height Weight   (!) 160/82 97.6 °F (36.4 °C) Oral 84 15 98 % -- 270 lb (122.5 kg)       Physical Exam   Constitutional: He is oriented to person, place, and time. He appears well-developed and well-nourished. HENT:   Head: Normocephalic and atraumatic.    Right Ear: External ear normal.   Left Ear: External ear normal.   Nose: Nose normal.   Eyes: Right eye exhibits no discharge. Left eye exhibits no discharge. No scleral icterus. Neck: Normal range of motion. Cardiovascular: Normal rate, regular rhythm and normal heart sounds. Pulmonary/Chest: Effort normal and breath sounds normal. No stridor. No respiratory distress. He has no wheezes. He has no rales. Musculoskeletal: He exhibits no edema. Right shoulder: He exhibits decreased range of motion, tenderness and pain. He exhibits normal pulse and normal strength. Right lateral and posterior shoulder tenderness on palpation. Pain worsened by abduction, unable to elevate overhead. Radial pulse +2, sensation intact. Normal bilateral hand grasps. Neurological: He is alert and oriented to person, place, and time. Coordination normal.   Skin: Skin is warm and dry. He is not diaphoretic. Psychiatric: He has a normal mood and affect. His behavior is normal.       DIAGNOSTIC RESULTS     RADIOLOGY:   [] Visualized by me  And Henny Woods MD    Xr Shoulder Right (min 2 Views)    Result Date: 12/7/2017  EXAMINATION: 3 VIEWS OF THE RIGHT SHOULDER 12/7/2017 10:18 pm COMPARISON: None. HISTORY: ORDERING S STEM PROVIDED HISTORY: pain TECHNOLOGIST PROVIDED HISTORY: Reason for exam:->pain Acuity: Chronic Type of Exam: Initial Additional signs and symptoms: Pt c/o chronic right shoulder pain, much worse after work today. No injury. FINDINGS: No acute osseous abnormality is seen. The glenohumeral and acromioclavicular joints appear maintained. There is no evidence of dislocation. The soft tissues appear unremarkable. No acute abnormality identified of the right shoulder. LABS:  Labs Reviewed - No data to display    All other labs were within normal range or not returned as of this dictation.     EMERGENCY DEPARTMENT COURSE and DIFFERENTIAL DIAGNOSIS/MDM:   Patient to ED for evaluation of Right shoulder pain after repetitive movement at work earlier today. Symptoms got worse today. Patient has history of chronic problems with rotator cuff on the right. Recommended ice pack, NSAIDs, rest. Okay to discharge home. Follow-up with ortho as directed. Return to ED if worse. Vitals:    Vitals:    12/07/17 2131   BP: (!) 160/82   Pulse: 84   Resp: 15   Temp: 97.6 °F (36.4 °C)   TempSrc: Oral   SpO2: 98%   Weight: 270 lb (122.5 kg)       Vitals reviewed. PROCEDURES:      FINAL IMPRESSION      1.  Right shoulder pain, unspecified chronicity          DISPOSITION/PLAN   DISPOSITION Decision to Discharge    PATIENT REFERRED TO:  Darren Tatum MD  [address] [place] [address]  [address]  [address] [address]  [phone]            DISCHARGE MEDICATIONS:  New Prescriptions    CYCLOBENZAPRINE (FLEXERIL) 10 MG TABLET    Take 1 tablet by mouth 3 times daily as needed for Muscle spasms    NAPROXEN (NAPROSYN) 500 MG TABLET    Take 1 tablet by mouth 2 times daily       (Please note that portions of this note were completed with a voice recognition program.  Efforts were made to edit the dictations but occasionally words are mis-transcribed.)    [facility], [place], CNP  12/07/17 3105

## 2017-12-08 NOTE — ED NOTES
Pt given instructions for follow-up and discharge. Pt given education on flexeril and naproxen. Pt verbalizes understanding. Pt is A&O x4, PWD, eupneic, and ambulatory with steady, even gait upon discharge.       Luda Vail RN  12/07/17 7007

## 2018-01-03 ENCOUNTER — HOSPITAL ENCOUNTER (EMERGENCY)
Age: 48
Discharge: LEFT BEFORE DISCHARGE | End: 2018-01-03
Payer: MEDICARE

## 2018-01-04 NOTE — ED NOTES
Patient returns to triage desk, prior to room placement. Reports, \"I called my wife, she said it's like a $150 copay, I can't afford that-- so she's coming to get me and take me home. \"    He says, \"She'll take me to an urgent care or something. \"    In no apparent respiratory distress, speaking in full coherent sentences, and ambulatory without apparent issue.           Thais Delgadillo RN  01/03/18 1945

## 2018-11-15 PROBLEM — I48.91 A-FIB (HCC): Status: ACTIVE | Noted: 2018-11-15

## 2018-11-16 ENCOUNTER — HOSPITAL ENCOUNTER (INPATIENT)
Age: 48
LOS: 3 days | Discharge: HOME | DRG: 308 | End: 2018-11-19
Attending: INTERNAL MEDICINE | Admitting: INTERNAL MEDICINE
Payer: MEDICARE

## 2018-11-16 DIAGNOSIS — I48.91 NEW ONSET ATRIAL FIBRILLATION (HCC): ICD-10-CM

## 2018-11-16 DIAGNOSIS — E11.65 TYPE 2 DIABETES MELLITUS WITH HYPERGLYCEMIA, WITHOUT LONG-TERM CURRENT USE OF INSULIN (HCC): ICD-10-CM

## 2018-11-16 DIAGNOSIS — G47.33 OSA (OBSTRUCTIVE SLEEP APNEA): Primary | ICD-10-CM

## 2018-11-16 DIAGNOSIS — E66.9 OBESITY (BMI 30-39.9): Chronic | ICD-10-CM

## 2018-11-16 PROBLEM — J18.1 LOBAR PNEUMONIA (HCC): Status: ACTIVE | Noted: 2018-11-16

## 2018-11-16 PROBLEM — J96.01 ACUTE HYPOXEMIC RESPIRATORY FAILURE (HCC): Status: ACTIVE | Noted: 2018-11-16

## 2018-11-16 PROBLEM — R73.9 HYPERGLYCEMIA: Status: ACTIVE | Noted: 2018-11-16

## 2018-11-16 PROBLEM — R06.83 SNORES: Status: ACTIVE | Noted: 2018-11-16

## 2018-11-16 PROBLEM — E11.9 NEW ONSET TYPE 2 DIABETES MELLITUS (HCC): Status: ACTIVE | Noted: 2018-11-16

## 2018-11-16 LAB
ALBUMIN SERPL-MCNC: 2.9 G/DL (ref 3.5–5.2)
ALBUMIN/GLOBULIN RATIO: 0.7 (ref 1–2.5)
ALP BLD-CCNC: 69 U/L (ref 40–129)
ALT SERPL-CCNC: 20 U/L (ref 5–41)
ANION GAP SERPL CALCULATED.3IONS-SCNC: 13 MMOL/L (ref 9–17)
AST SERPL-CCNC: 14 U/L
BILIRUB SERPL-MCNC: 0.22 MG/DL (ref 0.3–1.2)
BNP INTERPRETATION: ABNORMAL
BUN BLDV-MCNC: 13 MG/DL (ref 6–20)
BUN/CREAT BLD: ABNORMAL (ref 9–20)
CALCIUM IONIZED: 1.12 MMOL/L (ref 1.13–1.33)
CALCIUM SERPL-MCNC: 8.3 MG/DL (ref 8.6–10.4)
CHLORIDE BLD-SCNC: 104 MMOL/L (ref 98–107)
CO2: 20 MMOL/L (ref 20–31)
CREAT SERPL-MCNC: 1.03 MG/DL (ref 0.7–1.2)
EKG ATRIAL RATE: 85 BPM
EKG P AXIS: -10 DEGREES
EKG P-R INTERVAL: 124 MS
EKG Q-T INTERVAL: 358 MS
EKG QRS DURATION: 80 MS
EKG QTC CALCULATION (BAZETT): 426 MS
EKG R AXIS: 5 DEGREES
EKG T AXIS: 11 DEGREES
EKG VENTRICULAR RATE: 85 BPM
ESTIMATED AVERAGE GLUCOSE: 151 MG/DL
GFR AFRICAN AMERICAN: >60 ML/MIN
GFR NON-AFRICAN AMERICAN: >60 ML/MIN
GFR SERPL CREATININE-BSD FRML MDRD: ABNORMAL ML/MIN/{1.73_M2}
GFR SERPL CREATININE-BSD FRML MDRD: ABNORMAL ML/MIN/{1.73_M2}
GLUCOSE BLD-MCNC: 245 MG/DL (ref 70–99)
GLUCOSE BLD-MCNC: 258 MG/DL (ref 75–110)
GLUCOSE BLD-MCNC: 284 MG/DL (ref 75–110)
GLUCOSE BLD-MCNC: 332 MG/DL (ref 75–110)
GLUCOSE BLD-MCNC: 367 MG/DL (ref 75–110)
GLUCOSE BLD-MCNC: 439 MG/DL (ref 75–110)
GLUCOSE BLD-MCNC: 452 MG/DL (ref 75–110)
HBA1C MFR BLD: 6.9 % (ref 4–6)
LACTIC ACID, SEPSIS WHOLE BLOOD: 1 MMOL/L (ref 0.5–1.9)
LACTIC ACID, SEPSIS: NORMAL MMOL/L (ref 0.5–1.9)
LV EF: 50 %
LVEF MODALITY: NORMAL
MAGNESIUM: 2.5 MG/DL (ref 1.6–2.6)
PHOSPHORUS: 2.2 MG/DL (ref 2.5–4.5)
POTASSIUM SERPL-SCNC: 4.2 MMOL/L (ref 3.7–5.3)
PRO-BNP: 657 PG/ML
SODIUM BLD-SCNC: 137 MMOL/L (ref 135–144)
TOTAL PROTEIN: 6.8 G/DL (ref 6.4–8.3)
TROPONIN INTERP: NORMAL
TROPONIN INTERP: NORMAL
TROPONIN T: <0.03 NG/ML
TROPONIN T: <0.03 NG/ML
TSH SERPL DL<=0.05 MIU/L-ACNC: 1.41 MIU/L (ref 0.3–5)

## 2018-11-16 PROCEDURE — 36415 COLL VENOUS BLD VENIPUNCTURE: CPT

## 2018-11-16 PROCEDURE — 6360000002 HC RX W HCPCS: Performed by: INTERNAL MEDICINE

## 2018-11-16 PROCEDURE — G8987 SELF CARE CURRENT STATUS: HCPCS

## 2018-11-16 PROCEDURE — 83735 ASSAY OF MAGNESIUM: CPT

## 2018-11-16 PROCEDURE — 97161 PT EVAL LOW COMPLEX 20 MIN: CPT

## 2018-11-16 PROCEDURE — 97165 OT EVAL LOW COMPLEX 30 MIN: CPT

## 2018-11-16 PROCEDURE — 94762 N-INVAS EAR/PLS OXIMTRY CONT: CPT

## 2018-11-16 PROCEDURE — 6360000002 HC RX W HCPCS: Performed by: NURSE PRACTITIONER

## 2018-11-16 PROCEDURE — 80053 COMPREHEN METABOLIC PANEL: CPT

## 2018-11-16 PROCEDURE — 93306 TTE W/DOPPLER COMPLETE: CPT

## 2018-11-16 PROCEDURE — 84443 ASSAY THYROID STIM HORMONE: CPT

## 2018-11-16 PROCEDURE — 99223 1ST HOSP IP/OBS HIGH 75: CPT | Performed by: INTERNAL MEDICINE

## 2018-11-16 PROCEDURE — 84100 ASSAY OF PHOSPHORUS: CPT

## 2018-11-16 PROCEDURE — G8988 SELF CARE GOAL STATUS: HCPCS

## 2018-11-16 PROCEDURE — 6370000000 HC RX 637 (ALT 250 FOR IP): Performed by: INTERNAL MEDICINE

## 2018-11-16 PROCEDURE — 97530 THERAPEUTIC ACTIVITIES: CPT

## 2018-11-16 PROCEDURE — 83880 ASSAY OF NATRIURETIC PEPTIDE: CPT

## 2018-11-16 PROCEDURE — 2580000003 HC RX 258: Performed by: NURSE PRACTITIONER

## 2018-11-16 PROCEDURE — G8978 MOBILITY CURRENT STATUS: HCPCS

## 2018-11-16 PROCEDURE — 83605 ASSAY OF LACTIC ACID: CPT

## 2018-11-16 PROCEDURE — 93005 ELECTROCARDIOGRAM TRACING: CPT

## 2018-11-16 PROCEDURE — 87040 BLOOD CULTURE FOR BACTERIA: CPT

## 2018-11-16 PROCEDURE — G8979 MOBILITY GOAL STATUS: HCPCS

## 2018-11-16 PROCEDURE — 6370000000 HC RX 637 (ALT 250 FOR IP): Performed by: NURSE PRACTITIONER

## 2018-11-16 PROCEDURE — 83036 HEMOGLOBIN GLYCOSYLATED A1C: CPT

## 2018-11-16 PROCEDURE — 84484 ASSAY OF TROPONIN QUANT: CPT

## 2018-11-16 PROCEDURE — 82947 ASSAY GLUCOSE BLOOD QUANT: CPT

## 2018-11-16 PROCEDURE — G8980 MOBILITY D/C STATUS: HCPCS

## 2018-11-16 PROCEDURE — 82330 ASSAY OF CALCIUM: CPT

## 2018-11-16 PROCEDURE — 2060000000 HC ICU INTERMEDIATE R&B

## 2018-11-16 RX ORDER — ESMOLOL HYDROCHLORIDE 10 MG/ML
500 INJECTION INTRAVENOUS ONCE
Status: DISCONTINUED | OUTPATIENT
Start: 2018-11-16 | End: 2018-11-16

## 2018-11-16 RX ORDER — SODIUM CHLORIDE 0.9 % (FLUSH) 0.9 %
10 SYRINGE (ML) INJECTION PRN
Status: DISCONTINUED | OUTPATIENT
Start: 2018-11-16 | End: 2018-11-19 | Stop reason: HOSPADM

## 2018-11-16 RX ORDER — DEXTROSE MONOHYDRATE 50 MG/ML
100 INJECTION, SOLUTION INTRAVENOUS PRN
Status: DISCONTINUED | OUTPATIENT
Start: 2018-11-16 | End: 2018-11-19 | Stop reason: HOSPADM

## 2018-11-16 RX ORDER — NICOTINE POLACRILEX 4 MG
15 LOZENGE BUCCAL PRN
Status: DISCONTINUED | OUTPATIENT
Start: 2018-11-16 | End: 2018-11-19 | Stop reason: HOSPADM

## 2018-11-16 RX ORDER — GUAIFENESIN DEXTROMETHORPHAN HYDROBROMIDE ORAL SOLUTION 10; 100 MG/5ML; MG/5ML
10 SOLUTION ORAL EVERY 4 HOURS PRN
Status: DISCONTINUED | OUTPATIENT
Start: 2018-11-16 | End: 2018-11-19 | Stop reason: HOSPADM

## 2018-11-16 RX ORDER — ONDANSETRON 2 MG/ML
4 INJECTION INTRAMUSCULAR; INTRAVENOUS EVERY 6 HOURS PRN
Status: DISCONTINUED | OUTPATIENT
Start: 2018-11-16 | End: 2018-11-19 | Stop reason: HOSPADM

## 2018-11-16 RX ORDER — SODIUM CHLORIDE 0.9 % (FLUSH) 0.9 %
10 SYRINGE (ML) INJECTION EVERY 12 HOURS SCHEDULED
Status: DISCONTINUED | OUTPATIENT
Start: 2018-11-16 | End: 2018-11-19 | Stop reason: HOSPADM

## 2018-11-16 RX ORDER — ESMOLOL HYDROCHLORIDE 10 MG/ML
50 INJECTION, SOLUTION INTRAVENOUS CONTINUOUS
Status: DISCONTINUED | OUTPATIENT
Start: 2018-11-16 | End: 2018-11-19 | Stop reason: HOSPADM

## 2018-11-16 RX ORDER — DEXTROSE MONOHYDRATE 25 G/50ML
12.5 INJECTION, SOLUTION INTRAVENOUS PRN
Status: DISCONTINUED | OUTPATIENT
Start: 2018-11-16 | End: 2018-11-19 | Stop reason: HOSPADM

## 2018-11-16 RX ORDER — FUROSEMIDE 10 MG/ML
20 INJECTION INTRAMUSCULAR; INTRAVENOUS DAILY
Status: DISCONTINUED | OUTPATIENT
Start: 2018-11-16 | End: 2018-11-19 | Stop reason: HOSPADM

## 2018-11-16 RX ORDER — NICOTINE 21 MG/24HR
1 PATCH, TRANSDERMAL 24 HOURS TRANSDERMAL DAILY PRN
Status: DISCONTINUED | OUTPATIENT
Start: 2018-11-16 | End: 2018-11-19 | Stop reason: HOSPADM

## 2018-11-16 RX ORDER — SODIUM CHLORIDE 9 MG/ML
INJECTION, SOLUTION INTRAVENOUS CONTINUOUS
Status: DISCONTINUED | OUTPATIENT
Start: 2018-11-16 | End: 2018-11-19 | Stop reason: HOSPADM

## 2018-11-16 RX ORDER — ACETAMINOPHEN 325 MG/1
650 TABLET ORAL EVERY 4 HOURS PRN
Status: DISCONTINUED | OUTPATIENT
Start: 2018-11-16 | End: 2018-11-19 | Stop reason: HOSPADM

## 2018-11-16 RX ORDER — ASPIRIN 81 MG/1
81 TABLET, CHEWABLE ORAL DAILY
Status: DISCONTINUED | OUTPATIENT
Start: 2018-11-16 | End: 2018-11-19 | Stop reason: HOSPADM

## 2018-11-16 RX ADMIN — ASPIRIN 81 MG: 81 TABLET, CHEWABLE ORAL at 09:19

## 2018-11-16 RX ADMIN — INSULIN LISPRO 4 UNITS: 100 INJECTION, SOLUTION INTRAVENOUS; SUBCUTANEOUS at 17:41

## 2018-11-16 RX ADMIN — ACETAMINOPHEN 650 MG: 325 TABLET ORAL at 06:02

## 2018-11-16 RX ADMIN — INSULIN LISPRO 3 UNITS: 100 INJECTION, SOLUTION INTRAVENOUS; SUBCUTANEOUS at 14:22

## 2018-11-16 RX ADMIN — INSULIN LISPRO 3 UNITS: 100 INJECTION, SOLUTION INTRAVENOUS; SUBCUTANEOUS at 22:54

## 2018-11-16 RX ADMIN — INSULIN LISPRO 2 UNITS: 100 INJECTION, SOLUTION INTRAVENOUS; SUBCUTANEOUS at 23:48

## 2018-11-16 RX ADMIN — SODIUM CHLORIDE: 9 INJECTION, SOLUTION INTRAVENOUS at 04:45

## 2018-11-16 RX ADMIN — Medication 10 ML: at 21:47

## 2018-11-16 RX ADMIN — AZITHROMYCIN MONOHYDRATE 500 MG: 500 INJECTION, POWDER, LYOPHILIZED, FOR SOLUTION INTRAVENOUS at 04:45

## 2018-11-16 RX ADMIN — FUROSEMIDE 20 MG: 10 INJECTION, SOLUTION INTRAMUSCULAR; INTRAVENOUS at 17:44

## 2018-11-16 RX ADMIN — CEFTRIAXONE SODIUM 1 G: 1 INJECTION, POWDER, FOR SOLUTION INTRAMUSCULAR; INTRAVENOUS at 04:44

## 2018-11-16 RX ADMIN — ENOXAPARIN SODIUM 135 MG: 150 INJECTION SUBCUTANEOUS at 09:19

## 2018-11-16 RX ADMIN — ENOXAPARIN SODIUM 135 MG: 150 INJECTION SUBCUTANEOUS at 21:35

## 2018-11-16 ASSESSMENT — PAIN DESCRIPTION - PAIN TYPE
TYPE: CHRONIC PAIN
TYPE: CHRONIC PAIN

## 2018-11-16 ASSESSMENT — PAIN SCALES - GENERAL
PAINLEVEL_OUTOF10: 0
PAINLEVEL_OUTOF10: 5
PAINLEVEL_OUTOF10: 5

## 2018-11-16 ASSESSMENT — PAIN DESCRIPTION - LOCATION
LOCATION: NECK;SHOULDER
LOCATION: NECK;SHOULDER;RIB CAGE

## 2018-11-16 ASSESSMENT — PAIN DESCRIPTION - ORIENTATION: ORIENTATION: RIGHT

## 2018-11-16 NOTE — H&P
Taking? Authorizing Provider   clopidogrel (PLAVIX) 75 MG tablet Take 75 mg by mouth daily    Historical Provider, MD   naproxen (NAPROSYN) 500 MG tablet Take 1 tablet by mouth 2 times daily 12/7/17   JOSE ANGEL Acuna CNP   cyclobenzaprine (FLEXERIL) 10 MG tablet Take 1 tablet by mouth 3 times daily as needed for Muscle spasms 12/7/17   JOSE ANGEL Spivey CNP   aspirin 81 MG chewable tablet Take 1 tablet by mouth daily 6/13/17   JOSE ANGEL Rodriguez CNP   nitroGLYCERIN (NITROSTAT) 0.4 MG SL tablet up to max of 3 total doses. If no relief after 1 dose, call 911. 6/13/17   JOSE ANGEL Rodriguez CNP   carvedilol (COREG) 6.25 MG tablet Take 1 tablet by mouth 2 times daily (with meals) 6/13/17   JOSE ANGEL Rodriguez CNP   atorvastatin (LIPITOR) 80 MG tablet Take 1 tablet by mouth nightly 6/13/17   JOSE ANGEL Rodriguez CNP   lisinopril (PRINIVIL;ZESTRIL) 5 MG tablet Take 0.5 tablets by mouth daily 6/13/17   JOSE ANGEL Rodriguez CNP   ondansetron (ZOFRAN) 4 MG tablet Take 1 tablet by mouth every 8 hours as needed for Nausea. 8/3/14   Alver Shoulder        Allergies:     Patient has no known allergies. Social History:     Tobacco:    reports that he has been smoking Cigarettes. He has never used smokeless tobacco.  Alcohol:      reports that he drinks alcohol. Drug Use:  reports that he does not use drugs. Family History:     Family History   Problem Relation Age of Onset    Diabetes Mother     High Blood Pressure Mother     Heart Disease Mother     Coronary Art Dis Mother     Other Father         MVA       Review of Systems:     Positive and Negative as described in HPI. CONSTITUTIONAL:  Positive for  fevers, chills, sweats, fatigue,  negative for weight loss  HEENT:  negative for vision, hearing changes, runny nose, throat pain  RESPIRATORY:   positive for  shortness of breath, cough, congestion, wheezing.   CARDIOVASCULAR:  negative for chest pain, palpitations  GASTROINTESTINAL:  negative for nausea, vomiting, diarrhea, constipation, change in bowel habits, abdominal pain   GENITOURINARY:  negative for difficulty of urination, burning with urination, frequency   INTEGUMENT:  negative for rash, skin lesions, easy bruising   HEMATOLOGIC/LYMPHATIC:  negative for swelling/edema   ALLERGIC/IMMUNOLOGIC:  negative for urticaria , itching  ENDOCRINE:  negative increase in drinking, increase in urination, hot or cold intolerance  MUSCULOSKELETAL:  negative joint pains, muscle aches, swelling of joints  NEUROLOGICAL:  negative for headaches, dizziness, lightheadedness, numbness, pain, tingling extremities  BEHAVIOR/PSYCH:  negative for depression, anxiety    Physical Exam:   /69   Pulse 86   Temp 97.9 °F (36.6 °C) (Oral)   Resp 18   Ht 6' (1.829 m)   Wt 285 lb 12.8 oz (129.6 kg)   SpO2 93%   BMI 38.76 kg/m²   Temp (24hrs), Av.6 °F (37 °C), Min:97.9 °F (36.6 °C), Max:99.1 °F (37.3 °C)    Recent Labs      18   0556  18   1108   POCGLU  258*  284*     No intake or output data in the 24 hours ending 18 1545    General Appearance:  alert, acutely ill appearing, and in no acute distress, sweaty   Mental status: oriented to person, place, and time with normal affect  Head:  normocephalic, atraumatic. Eye: no icterus, redness, pupils equal and reactive, extraocular eye movements intact, conjunctiva clear  Ear: normal external ear, no discharge, hearing intact  Nose:  no drainage noted  Mouth: mucous membranes moist  Neck: supple, no carotid bruits, thyroid not palpable  Lungs: Bilateral equal air entry, diffuse wheezing and rhonchi, normal effort  Cardiovascular: normal rate, regular rhythm, no murmur, gallop, rub.   Abdomen: Soft, nontender, nondistended, normal bowel sounds, no hepatomegaly or splenomegaly  Neurologic: There are no new focal motor or sensory deficits, normal muscle tone and bulk, no abnormal sensation, normal speech, Chloride 104 98 - 107 mmol/L    CO2 20 20 - 31 mmol/L    Anion Gap 13 9 - 17 mmol/L    Alkaline Phosphatase 69 40 - 129 U/L    ALT 20 5 - 41 U/L    AST 14 <40 U/L    Total Bilirubin 0.22 (L) 0.3 - 1.2 mg/dL    Total Protein 6.8 6.4 - 8.3 g/dL    Alb 2.9 (L) 3.5 - 5.2 g/dL    Albumin/Globulin Ratio 0.7 (L) 1.0 - 2.5    GFR Non-African American >60 >60 mL/min    GFR African American >60 >60 mL/min    GFR Comment          GFR Staging NOT REPORTED    Ionized Calcium    Collection Time: 11/16/18  8:06 AM   Result Value Ref Range    Calcium, Ion 1.12 (L) 1.13 - 1.33 mmol/L   Magnesium    Collection Time: 11/16/18  8:06 AM   Result Value Ref Range    Magnesium 2.5 1.6 - 2.6 mg/dL   Phosphorus    Collection Time: 11/16/18  8:06 AM   Result Value Ref Range    Phosphorus 2.2 (L) 2.5 - 4.5 mg/dL   TSH with Reflex    Collection Time: 11/16/18  8:06 AM   Result Value Ref Range    TSH 1.41 0.30 - 5.00 mIU/L   Hemoglobin A1C    Collection Time: 11/16/18  8:06 AM   Result Value Ref Range    Hemoglobin A1C 6.9 (H) 4.0 - 6.0 %    Estimated Avg Glucose 151 mg/dL   POC Glucose Fingerstick    Collection Time: 11/16/18 11:08 AM   Result Value Ref Range    POC Glucose 284 (H) 75 - 110 mg/dL   Troponin    Collection Time: 11/16/18 11:53 AM   Result Value Ref Range    Troponin T <0.03 <0.03 ng/mL    Troponin Interp             Imaging/Diagnostics:    Chest x-ray from outside facility with right lower lobe pneumonia, confirmed on CT scan without evidence of pulmonary malaise and    Assessment :      Primary Problem  Lobar pneumonia [facility])    Active Hospital Problems    Diagnosis Date Noted    Coronary artery disease involving native coronary artery [I25.10] 06/12/2017     Priority: High    Cocaine abuse ([facility] [F14.10] 06/12/2017     Priority: High    Lobar pneumonia ([facility] [J18.1] 11/16/2018    Hyperglycemia [R73.9] 11/16/2018    New onset type 2 diabetes mellitus ([facility] [E11.9] 11/16/2018    Acute hypoxemic respiratory failure (HCC)

## 2018-11-16 NOTE — PROGRESS NOTES
Stairs - Number of Steps: 2 flights of stairs   Bathroom Shower/Tub: Tub/Shower unit  ADL Assistance: Independent  Homemaking Assistance: Independent  Homemaking Responsibilities: Yes  Meal Prep Responsibility: Primary  Laundry Responsibility: Primary  Cleaning Responsibility: Primary  Shopping Responsibility: Primary  Ambulation Assistance: Independent  Transfer Assistance: Independent  Active : Yes  Cognition   Cognition  Overall Cognitive Status: WFL  Cognition Comment: falling asleep answering questions in bed, but consistently awake after initialting mobility, to EOB and amb. Objective          AROM RLE (degrees)  RLE AROM: WNL  AROM LLE (degrees)  LLE AROM : WNL  AROM RUE (degrees)  RUE AROM : WNL  AROM LUE (degrees)  LUE AROM : WNL  Strength RLE  Strength RLE: WNL  Strength LLE  Strength LLE: WNL  Strength RUE  Strength RUE: WNL  Strength LUE  Strength LUE: WNL     Sensation  Overall Sensation Status: Impaired (Pt reports numbness/ tingling L bicep)  Bed mobility  Supine to Sit: Supervision  Sit to Supine: Independent  Scooting: Independent  Transfers  Sit to Stand: Stand by assistance  Stand to sit: Stand by assistance  Ambulation  Ambulation?: Yes  Ambulation 1  Surface: level tile  Device: No Device  Assistance: Contact guard assistance; Independent  Quality of Gait: initally CGA, pt corssing LEs over midling without LOB, self correcting improving to I with distance. Distance: 250'  Stairs/Curb  Stairs?: No     Balance  Posture: Good  Sitting - Static: Good  Standing - Static: Good  Standing - Dynamic: Good  Comments: no AD        Assessment   Assessment: amb 250' no AD I, initally CGA with steps crossing midling, likely due to just waking up.  Pt then I with amb. home  Prognosis: Good  Decision Making: Low Complexity  Patient Education: PT POC  REQUIRES PT FOLLOW UP: No  Activity Tolerance  Activity Tolerance: Patient Tolerated treatment well         Plan   Plan  Times per week: D/C PT  Safety

## 2018-11-16 NOTE — PROGRESS NOTES
Social/Functional History  Social/Functional History  Lives With: Alone  Type of Home: Apartment  Home Layout: Multi-level (pt reports living on 2nd floor with no elevator access)  Home Access: Stairs to enter with rails  Entrance Stairs - Number of Steps: 2 flights of stairs   Bathroom Shower/Tub: Tub/Shower unit  ADL Assistance: Independent  Homemaking Assistance: Independent  Homemaking Responsibilities: Yes  Meal Prep Responsibility: Primary  Laundry Responsibility: Primary  Cleaning Responsibility: Primary  Shopping Responsibility: Primary  Ambulation Assistance: Independent  Transfer Assistance: Independent  Active : Yes     Objective   Vision: Within Functional Limits  Hearing: Within functional limits    Orientation  Overall Orientation Status: Within Functional Limits     Balance  Sitting Balance: Modified independent  (~3 minutes on EOB )  Standing Balance: Modified independent   Standing Balance  Time: ~4 minutes  Activity: pt completed functional mobility in hallway with PT and within hospital room   Sit to stand: Supervision  Stand to sit: Supervision  Comment: pt steady on feet and reported no dizziness or SOB      ADL  Feeding: Independent  Grooming: Independent  UE Bathing: Modified independent   LE Bathing: Supervision  UE Dressing: Modified independent   LE Dressing: Supervision (pt donned/doffed bilateral shoes while sitting on EOB )  Toileting: Modified independent     RUE Tone: Normotonic  LUE Tone: Normotonic    Coordination  Movements Are Fluid And Coordinated: Yes     Bed mobility  Supine to Sit: Supervision  Sit to Supine: Modified independent  Scooting: Modified independent  Transfers  Sit to stand: Supervision  Stand to sit: Supervision     Cognition  Overall Cognitive Status: WFL  Cognition Comment: pt lethargic but able to maintain arousal once sitting on EOB      Sensation  Overall Sensation Status: Impaired (pt reported numbness/tinling to L bicep)      LUE AROM : WFL  Left

## 2018-11-17 ENCOUNTER — APPOINTMENT (OUTPATIENT)
Dept: NUCLEAR MEDICINE | Age: 48
DRG: 308 | End: 2018-11-17
Attending: INTERNAL MEDICINE
Payer: MEDICARE

## 2018-11-17 PROBLEM — I51.9 LEFT VENTRICULAR SYSTOLIC DYSFUNCTION: Status: ACTIVE | Noted: 2018-11-17

## 2018-11-17 LAB
ABSOLUTE EOS #: <0.03 K/UL (ref 0–0.44)
ABSOLUTE IMMATURE GRANULOCYTE: 0.24 K/UL (ref 0–0.3)
ABSOLUTE LYMPH #: 1.84 K/UL (ref 1.1–3.7)
ABSOLUTE MONO #: 1 K/UL (ref 0.1–1.2)
ALBUMIN SERPL-MCNC: 3 G/DL (ref 3.5–5.2)
ALBUMIN/GLOBULIN RATIO: 0.8 (ref 1–2.5)
ALP BLD-CCNC: 80 U/L (ref 40–129)
ALT SERPL-CCNC: 33 U/L (ref 5–41)
ANION GAP SERPL CALCULATED.3IONS-SCNC: 13 MMOL/L (ref 9–17)
AST SERPL-CCNC: 22 U/L
BASOPHILS # BLD: 0 % (ref 0–2)
BASOPHILS ABSOLUTE: 0.05 K/UL (ref 0–0.2)
BILIRUB SERPL-MCNC: <0.1 MG/DL (ref 0.3–1.2)
BUN BLDV-MCNC: 11 MG/DL (ref 6–20)
BUN/CREAT BLD: ABNORMAL (ref 9–20)
CALCIUM SERPL-MCNC: 8.5 MG/DL (ref 8.6–10.4)
CHLORIDE BLD-SCNC: 102 MMOL/L (ref 98–107)
CO2: 21 MMOL/L (ref 20–31)
CREAT SERPL-MCNC: 0.84 MG/DL (ref 0.7–1.2)
DIFFERENTIAL TYPE: ABNORMAL
EOSINOPHILS RELATIVE PERCENT: 0 % (ref 1–4)
GFR AFRICAN AMERICAN: >60 ML/MIN
GFR NON-AFRICAN AMERICAN: >60 ML/MIN
GFR SERPL CREATININE-BSD FRML MDRD: ABNORMAL ML/MIN/{1.73_M2}
GFR SERPL CREATININE-BSD FRML MDRD: ABNORMAL ML/MIN/{1.73_M2}
GLUCOSE BLD-MCNC: 186 MG/DL (ref 75–110)
GLUCOSE BLD-MCNC: 203 MG/DL (ref 75–110)
GLUCOSE BLD-MCNC: 226 MG/DL (ref 75–110)
GLUCOSE BLD-MCNC: 258 MG/DL (ref 70–99)
GLUCOSE BLD-MCNC: 303 MG/DL (ref 75–110)
HCT VFR BLD CALC: 40.8 % (ref 40.7–50.3)
HEMOGLOBIN: 13.2 G/DL (ref 13–17)
IMMATURE GRANULOCYTES: 1 %
LV EF: 48 %
LVEF MODALITY: NORMAL
LYMPHOCYTES # BLD: 9 % (ref 24–43)
MCH RBC QN AUTO: 29.9 PG (ref 25.2–33.5)
MCHC RBC AUTO-ENTMCNC: 32.4 G/DL (ref 28.4–34.8)
MCV RBC AUTO: 92.3 FL (ref 82.6–102.9)
MONOCYTES # BLD: 5 % (ref 3–12)
NRBC AUTOMATED: 0 PER 100 WBC
PDW BLD-RTO: 13.2 % (ref 11.8–14.4)
PLATELET # BLD: 268 K/UL (ref 138–453)
PLATELET ESTIMATE: ABNORMAL
PMV BLD AUTO: 10 FL (ref 8.1–13.5)
POTASSIUM SERPL-SCNC: 4.4 MMOL/L (ref 3.7–5.3)
RBC # BLD: 4.42 M/UL (ref 4.21–5.77)
RBC # BLD: ABNORMAL 10*6/UL
SEG NEUTROPHILS: 85 % (ref 36–65)
SEGMENTED NEUTROPHILS ABSOLUTE COUNT: 17.62 K/UL (ref 1.5–8.1)
SODIUM BLD-SCNC: 136 MMOL/L (ref 135–144)
TOTAL PROTEIN: 6.8 G/DL (ref 6.4–8.3)
WBC # BLD: 20.8 K/UL (ref 3.5–11.3)
WBC # BLD: ABNORMAL 10*3/UL

## 2018-11-17 PROCEDURE — 6360000002 HC RX W HCPCS: Performed by: INTERNAL MEDICINE

## 2018-11-17 PROCEDURE — 6360000002 HC RX W HCPCS: Performed by: NURSE PRACTITIONER

## 2018-11-17 PROCEDURE — 2580000003 HC RX 258: Performed by: NURSE PRACTITIONER

## 2018-11-17 PROCEDURE — 6370000000 HC RX 637 (ALT 250 FOR IP): Performed by: INTERNAL MEDICINE

## 2018-11-17 PROCEDURE — 99232 SBSQ HOSP IP/OBS MODERATE 35: CPT | Performed by: INTERNAL MEDICINE

## 2018-11-17 PROCEDURE — 85025 COMPLETE CBC W/AUTO DIFF WBC: CPT

## 2018-11-17 PROCEDURE — 2060000000 HC ICU INTERMEDIATE R&B

## 2018-11-17 PROCEDURE — 94762 N-INVAS EAR/PLS OXIMTRY CONT: CPT

## 2018-11-17 PROCEDURE — 82947 ASSAY GLUCOSE BLOOD QUANT: CPT

## 2018-11-17 PROCEDURE — 94660 CPAP INITIATION&MGMT: CPT

## 2018-11-17 PROCEDURE — 80053 COMPREHEN METABOLIC PANEL: CPT

## 2018-11-17 PROCEDURE — 2580000003 HC RX 258: Performed by: INTERNAL MEDICINE

## 2018-11-17 PROCEDURE — 3430000000 HC RX DIAGNOSTIC RADIOPHARMACEUTICAL: Performed by: NURSE PRACTITIONER

## 2018-11-17 PROCEDURE — 36415 COLL VENOUS BLD VENIPUNCTURE: CPT

## 2018-11-17 PROCEDURE — 93017 CV STRESS TEST TRACING ONLY: CPT | Performed by: NURSE PRACTITIONER

## 2018-11-17 PROCEDURE — 78452 HT MUSCLE IMAGE SPECT MULT: CPT

## 2018-11-17 PROCEDURE — 6370000000 HC RX 637 (ALT 250 FOR IP): Performed by: NURSE PRACTITIONER

## 2018-11-17 PROCEDURE — A9500 TC99M SESTAMIBI: HCPCS | Performed by: NURSE PRACTITIONER

## 2018-11-17 RX ORDER — POLYETHYLENE GLYCOL 3350 17 G/17G
17 POWDER, FOR SOLUTION ORAL DAILY
Status: DISCONTINUED | OUTPATIENT
Start: 2018-11-18 | End: 2018-11-19 | Stop reason: HOSPADM

## 2018-11-17 RX ORDER — HYDROCODONE POLISTIREX AND CHLORPHENIRAMINE POLISTIREX 10; 8 MG/5ML; MG/5ML
5 SUSPENSION, EXTENDED RELEASE ORAL EVERY 12 HOURS PRN
Status: DISCONTINUED | OUTPATIENT
Start: 2018-11-17 | End: 2018-11-19 | Stop reason: HOSPADM

## 2018-11-17 RX ORDER — SODIUM CHLORIDE 9 MG/ML
INJECTION, SOLUTION INTRAVENOUS ONCE
Status: DISCONTINUED | OUTPATIENT
Start: 2018-11-17 | End: 2018-11-17

## 2018-11-17 RX ORDER — METOPROLOL TARTRATE 5 MG/5ML
2.5 INJECTION INTRAVENOUS PRN
Status: DISCONTINUED | OUTPATIENT
Start: 2018-11-17 | End: 2018-11-17

## 2018-11-17 RX ORDER — SODIUM CHLORIDE 0.9 % (FLUSH) 0.9 %
10 SYRINGE (ML) INJECTION PRN
Status: DISCONTINUED | OUTPATIENT
Start: 2018-11-17 | End: 2018-11-17

## 2018-11-17 RX ORDER — SODIUM CHLORIDE 0.9 % (FLUSH) 0.9 %
10 SYRINGE (ML) INJECTION 2 TIMES DAILY
Status: DISCONTINUED | OUTPATIENT
Start: 2018-11-17 | End: 2018-11-19 | Stop reason: HOSPADM

## 2018-11-17 RX ORDER — NITROGLYCERIN 0.4 MG/1
0.4 TABLET SUBLINGUAL EVERY 5 MIN PRN
Status: DISCONTINUED | OUTPATIENT
Start: 2018-11-17 | End: 2018-11-17

## 2018-11-17 RX ORDER — AMINOPHYLLINE DIHYDRATE 25 MG/ML
100 INJECTION, SOLUTION INTRAVENOUS
Status: DISCONTINUED | OUTPATIENT
Start: 2018-11-17 | End: 2018-11-17 | Stop reason: RX

## 2018-11-17 RX ADMIN — Medication 10 ML: at 21:29

## 2018-11-17 RX ADMIN — Medication 10 ML: at 13:07

## 2018-11-17 RX ADMIN — INSULIN LISPRO 8 UNITS: 100 INJECTION, SOLUTION INTRAVENOUS; SUBCUTANEOUS at 17:20

## 2018-11-17 RX ADMIN — Medication 10 ML: at 10:01

## 2018-11-17 RX ADMIN — Medication 5 ML: at 22:00

## 2018-11-17 RX ADMIN — TETRAKIS(2-METHOXYISOBUTYLISOCYANIDE)COPPER(I) TETRAFLUOROBORATE 16.4 MILLICURIE: 1 INJECTION, POWDER, LYOPHILIZED, FOR SOLUTION INTRAVENOUS at 13:06

## 2018-11-17 RX ADMIN — AZITHROMYCIN MONOHYDRATE 500 MG: 500 INJECTION, POWDER, LYOPHILIZED, FOR SOLUTION INTRAVENOUS at 04:30

## 2018-11-17 RX ADMIN — METFORMIN HYDROCHLORIDE 500 MG: 500 TABLET ORAL at 17:21

## 2018-11-17 RX ADMIN — ACETAMINOPHEN 650 MG: 325 TABLET ORAL at 12:26

## 2018-11-17 RX ADMIN — REGADENOSON 0.4 MG: 0.08 INJECTION, SOLUTION INTRAVENOUS at 10:22

## 2018-11-17 RX ADMIN — ENOXAPARIN SODIUM 135 MG: 150 INJECTION SUBCUTANEOUS at 12:26

## 2018-11-17 RX ADMIN — Medication 10 ML: at 00:10

## 2018-11-17 RX ADMIN — CEFTRIAXONE SODIUM 1 G: 1 INJECTION, POWDER, FOR SOLUTION INTRAMUSCULAR; INTRAVENOUS at 04:25

## 2018-11-17 RX ADMIN — SODIUM CHLORIDE: 9 INJECTION, SOLUTION INTRAVENOUS at 12:29

## 2018-11-17 RX ADMIN — TETRAKIS(2-METHOXYISOBUTYLISOCYANIDE)COPPER(I) TETRAFLUOROBORATE 41.1 MILLICURIE: 1 INJECTION, POWDER, LYOPHILIZED, FOR SOLUTION INTRAVENOUS at 13:06

## 2018-11-17 RX ADMIN — INSULIN LISPRO 2 UNITS: 100 INJECTION, SOLUTION INTRAVENOUS; SUBCUTANEOUS at 13:04

## 2018-11-17 RX ADMIN — INSULIN LISPRO 1 UNITS: 100 INJECTION, SOLUTION INTRAVENOUS; SUBCUTANEOUS at 21:53

## 2018-11-17 RX ADMIN — Medication 10 ML: at 05:23

## 2018-11-17 RX ADMIN — ENOXAPARIN SODIUM 135 MG: 150 INJECTION SUBCUTANEOUS at 21:28

## 2018-11-17 RX ADMIN — ASPIRIN 81 MG: 81 TABLET, CHEWABLE ORAL at 12:26

## 2018-11-17 RX ADMIN — Medication 10 ML: at 09:24

## 2018-11-17 RX ADMIN — FUROSEMIDE 20 MG: 10 INJECTION, SOLUTION INTRAMUSCULAR; INTRAVENOUS at 12:28

## 2018-11-17 RX ADMIN — Medication 10 ML: at 13:08

## 2018-11-17 RX ADMIN — Medication 10 ML: at 12:28

## 2018-11-17 ASSESSMENT — PAIN DESCRIPTION - ORIENTATION: ORIENTATION: RIGHT

## 2018-11-17 ASSESSMENT — PAIN DESCRIPTION - DESCRIPTORS: DESCRIPTORS: SHARP;DISCOMFORT

## 2018-11-17 ASSESSMENT — PAIN SCALES - GENERAL
PAINLEVEL_OUTOF10: 5
PAINLEVEL_OUTOF10: 0
PAINLEVEL_OUTOF10: 0
PAINLEVEL_OUTOF10: 4

## 2018-11-17 ASSESSMENT — PAIN DESCRIPTION - PAIN TYPE: TYPE: ACUTE PAIN;CHRONIC PAIN

## 2018-11-17 ASSESSMENT — PAIN DESCRIPTION - FREQUENCY: FREQUENCY: CONTINUOUS

## 2018-11-17 ASSESSMENT — PAIN SCALES - WONG BAKER: WONGBAKER_NUMERICALRESPONSE: 0

## 2018-11-17 ASSESSMENT — PAIN DESCRIPTION - LOCATION: LOCATION: NECK;SHOULDER;RIB CAGE

## 2018-11-17 NOTE — PLAN OF CARE
NONINVASIVE VENTILATION     PROVIDE OPTIMAL VENTILATION/ACCEPTABLE SPO2              IMPLEMENT NONINVASIVE VENTILATION PROTOCOL              MAINTAIN ACCEPTABLE SPO2              ASSESS SKIN INTEGRITY/BREAKDOWN SCORE              PATIENT EDUCATION AS NEEDED              BIPAP AS NEEDED    Pt not ready for CPAP yet this evening. Pt states he will notify RN when ready. RT will await call.

## 2018-11-17 NOTE — PROGRESS NOTES
Estela [facility]    Progress Note    11/17/2018    2:56 PM    Name:   Collins Gottron  MRN:     [id number]     [place]:      [de-identified]   Room:   [address] [place] [address] Day:  1  Admit Date:  11/16/2018  3:34 AM    PCP:   No primary care provider on file. Code Status:  Full Code    Subjective:     C/C: Cough and shortness of breath, new onset A. Fib    Interval History Status: improved. He has decreased shortness of breath and sweats. Denies any fevers or chills. Continues to have harsh cough and chest congestion with mild sputum production. Denies any angina, nausea or vomiting or other complaints. Brief History: This is a 49-year-old white male who presented to the emergency room with a complaint shortness of breath and cough and was found have right lower lobe pneumonia as well as rapid atrial fibrillation. He's been admitted to the hospital for IV antibiotics, aerosols, anticoagulation for his A. fib as well as rate control. He has mild chest pain with the symptoms and he has a history of coronary artery disease and cardiology is recommended stress testing. Review of Systems:     Constitutional:  negative for chills, fevers, sweats  Respiratory:  Positive for cough, dyspnea on exertion, hemoptysis, shortness of breath, negative for wheezing  Cardiovascular:  negative for chest pain, chest pressure/discomfort, lower extremity edema, palpitations  Gastrointestinal:  negative for abdominal pain, constipation, diarrhea, nausea, vomiting  Neurological:  negative for dizziness, headache    Medications:      Allergies:  No Known Allergies    Current Meds:   Scheduled Meds:    sodium chloride flush  10 mL Intravenous BID    azithromycin  500 mg Intravenous Q24H    cefTRIAXone (ROCEPHIN) IV  1 g Intravenous Q24H    aspirin  81 mg Oral Daily    enoxaparin  1 mg/kg Subcutaneous BID    sodium chloride flush  10 mL Intravenous 2 times per day

## 2018-11-18 ENCOUNTER — APPOINTMENT (OUTPATIENT)
Dept: GENERAL RADIOLOGY | Age: 48
DRG: 308 | End: 2018-11-18
Attending: INTERNAL MEDICINE
Payer: MEDICARE

## 2018-11-18 LAB
ABSOLUTE EOS #: 0.29 K/UL (ref 0–0.44)
ABSOLUTE IMMATURE GRANULOCYTE: 0.22 K/UL (ref 0–0.3)
ABSOLUTE LYMPH #: 1.87 K/UL (ref 1.1–3.7)
ABSOLUTE MONO #: 0.94 K/UL (ref 0.1–1.2)
ANION GAP SERPL CALCULATED.3IONS-SCNC: 12 MMOL/L (ref 9–17)
BASOPHILS # BLD: 1 % (ref 0–2)
BASOPHILS ABSOLUTE: 0.06 K/UL (ref 0–0.2)
BUN BLDV-MCNC: 13 MG/DL (ref 6–20)
BUN/CREAT BLD: ABNORMAL (ref 9–20)
CALCIUM SERPL-MCNC: 8.9 MG/DL (ref 8.6–10.4)
CHLORIDE BLD-SCNC: 101 MMOL/L (ref 98–107)
CO2: 26 MMOL/L (ref 20–31)
CREAT SERPL-MCNC: 0.79 MG/DL (ref 0.7–1.2)
DIFFERENTIAL TYPE: ABNORMAL
EOSINOPHILS RELATIVE PERCENT: 3 % (ref 1–4)
GFR AFRICAN AMERICAN: >60 ML/MIN
GFR NON-AFRICAN AMERICAN: >60 ML/MIN
GFR SERPL CREATININE-BSD FRML MDRD: ABNORMAL ML/MIN/{1.73_M2}
GFR SERPL CREATININE-BSD FRML MDRD: ABNORMAL ML/MIN/{1.73_M2}
GLUCOSE BLD-MCNC: 146 MG/DL (ref 75–110)
GLUCOSE BLD-MCNC: 149 MG/DL (ref 70–99)
GLUCOSE BLD-MCNC: 152 MG/DL (ref 75–110)
GLUCOSE BLD-MCNC: 183 MG/DL (ref 75–110)
GLUCOSE BLD-MCNC: 199 MG/DL (ref 75–110)
HCT VFR BLD CALC: 41.4 % (ref 40.7–50.3)
HEMOGLOBIN: 13.5 G/DL (ref 13–17)
IMMATURE GRANULOCYTES: 2 %
LYMPHOCYTES # BLD: 18 % (ref 24–43)
MAGNESIUM: 2 MG/DL (ref 1.6–2.6)
MCH RBC QN AUTO: 29.2 PG (ref 25.2–33.5)
MCHC RBC AUTO-ENTMCNC: 32.6 G/DL (ref 28.4–34.8)
MCV RBC AUTO: 89.6 FL (ref 82.6–102.9)
MONOCYTES # BLD: 9 % (ref 3–12)
NRBC AUTOMATED: 0 PER 100 WBC
PDW BLD-RTO: 13.4 % (ref 11.8–14.4)
PLATELET # BLD: 264 K/UL (ref 138–453)
PLATELET ESTIMATE: ABNORMAL
PMV BLD AUTO: 9.6 FL (ref 8.1–13.5)
POTASSIUM SERPL-SCNC: 3.5 MMOL/L (ref 3.7–5.3)
RBC # BLD: 4.62 M/UL (ref 4.21–5.77)
RBC # BLD: ABNORMAL 10*6/UL
SEG NEUTROPHILS: 67 % (ref 36–65)
SEGMENTED NEUTROPHILS ABSOLUTE COUNT: 6.99 K/UL (ref 1.5–8.1)
SODIUM BLD-SCNC: 139 MMOL/L (ref 135–144)
WBC # BLD: 10.4 K/UL (ref 3.5–11.3)
WBC # BLD: ABNORMAL 10*3/UL

## 2018-11-18 PROCEDURE — 6370000000 HC RX 637 (ALT 250 FOR IP): Performed by: INTERNAL MEDICINE

## 2018-11-18 PROCEDURE — 6370000000 HC RX 637 (ALT 250 FOR IP): Performed by: NURSE PRACTITIONER

## 2018-11-18 PROCEDURE — 85025 COMPLETE CBC W/AUTO DIFF WBC: CPT

## 2018-11-18 PROCEDURE — 80048 BASIC METABOLIC PNL TOTAL CA: CPT

## 2018-11-18 PROCEDURE — 36415 COLL VENOUS BLD VENIPUNCTURE: CPT

## 2018-11-18 PROCEDURE — 2060000000 HC ICU INTERMEDIATE R&B

## 2018-11-18 PROCEDURE — 71045 X-RAY EXAM CHEST 1 VIEW: CPT

## 2018-11-18 PROCEDURE — 82947 ASSAY GLUCOSE BLOOD QUANT: CPT

## 2018-11-18 PROCEDURE — 6360000002 HC RX W HCPCS: Performed by: INTERNAL MEDICINE

## 2018-11-18 PROCEDURE — 83735 ASSAY OF MAGNESIUM: CPT

## 2018-11-18 PROCEDURE — 2580000003 HC RX 258: Performed by: NURSE PRACTITIONER

## 2018-11-18 PROCEDURE — 99232 SBSQ HOSP IP/OBS MODERATE 35: CPT | Performed by: INTERNAL MEDICINE

## 2018-11-18 PROCEDURE — 6360000002 HC RX W HCPCS: Performed by: NURSE PRACTITIONER

## 2018-11-18 RX ORDER — AZITHROMYCIN 250 MG/1
500 TABLET, FILM COATED ORAL EVERY 24 HOURS
Status: DISCONTINUED | OUTPATIENT
Start: 2018-11-19 | End: 2018-11-19 | Stop reason: HOSPADM

## 2018-11-18 RX ADMIN — INSULIN LISPRO 2 UNITS: 100 INJECTION, SOLUTION INTRAVENOUS; SUBCUTANEOUS at 09:25

## 2018-11-18 RX ADMIN — INSULIN LISPRO 2 UNITS: 100 INJECTION, SOLUTION INTRAVENOUS; SUBCUTANEOUS at 18:20

## 2018-11-18 RX ADMIN — FUROSEMIDE 20 MG: 10 INJECTION, SOLUTION INTRAMUSCULAR; INTRAVENOUS at 09:25

## 2018-11-18 RX ADMIN — ASPIRIN 81 MG: 81 TABLET, CHEWABLE ORAL at 09:25

## 2018-11-18 RX ADMIN — AZITHROMYCIN MONOHYDRATE 500 MG: 500 INJECTION, POWDER, LYOPHILIZED, FOR SOLUTION INTRAVENOUS at 05:44

## 2018-11-18 RX ADMIN — METFORMIN HYDROCHLORIDE 500 MG: 500 TABLET ORAL at 18:20

## 2018-11-18 RX ADMIN — CEFTRIAXONE SODIUM 1 G: 1 INJECTION, POWDER, FOR SOLUTION INTRAMUSCULAR; INTRAVENOUS at 05:04

## 2018-11-18 RX ADMIN — INSULIN LISPRO 2 UNITS: 100 INJECTION, SOLUTION INTRAVENOUS; SUBCUTANEOUS at 13:23

## 2018-11-18 RX ADMIN — ENOXAPARIN SODIUM 135 MG: 150 INJECTION SUBCUTANEOUS at 09:25

## 2018-11-18 RX ADMIN — METFORMIN HYDROCHLORIDE 500 MG: 500 TABLET ORAL at 09:25

## 2018-11-18 RX ADMIN — INSULIN LISPRO 1 UNITS: 100 INJECTION, SOLUTION INTRAVENOUS; SUBCUTANEOUS at 22:31

## 2018-11-18 RX ADMIN — Medication 10 ML: at 22:37

## 2018-11-18 RX ADMIN — Medication 10 ML: at 09:26

## 2018-11-18 RX ADMIN — RIVAROXABAN 20 MG: 20 TABLET, FILM COATED ORAL at 18:19

## 2018-11-18 RX ADMIN — POLYETHYLENE GLYCOL 3350 17 G: 17 POWDER, FOR SOLUTION ORAL at 09:26

## 2018-11-18 NOTE — PLAN OF CARE
Problem: Falls - Risk of:  Goal: Will remain free from falls  Will remain free from falls   Outcome: Met This Shift    Goal: Absence of physical injury  Absence of physical injury   Outcome: Met This Shift      Problem: Pain:  Goal: Pain level will decrease  Pain level will decrease   Outcome: Met This Shift    Goal: Control of acute pain  Control of acute pain   Outcome: Met This Shift    Goal: Control of chronic pain  Control of chronic pain   Outcome: Ongoing      Problem: Discharge Planning:  Goal: Discharged to appropriate level of care  Discharged to appropriate level of care   Outcome: Ongoing    Goal: Participates in care planning  Participates in care planning   Outcome: Ongoing      Problem: Airway Clearance - Ineffective:  Goal: Clear lung sounds  Clear lung sounds   Outcome: Not Met This Shift    Goal: Ability to maintain a clear airway will improve  Ability to maintain a clear airway will improve   Outcome: Ongoing      Problem: Fluid Volume - Deficit:  Goal: Achieves intake and output within specified parameters  Achieves intake and output within specified parameters   Outcome: Met This Shift      Problem: Hyperthermia:  Goal: Ability to maintain a body temperature in the normal range will improve  Ability to maintain a body temperature in the normal range will improve   Outcome: Met This Shift      Problem: Tobacco Use:  Goal: Will participate in inpatient tobacco-use cessation counseling  Will participate in inpatient tobacco-use cessation counseling   Outcome: Met This Shift  Patient is no longer wanting to smoke cigarettes as he wants to improve health.

## 2018-11-18 NOTE — PROGRESS NOTES
mg Oral Daily    enoxaparin  1 mg/kg Subcutaneous BID    sodium chloride flush  10 mL Intravenous 2 times per day    influenza virus vaccine  0.5 mL Intramuscular Once    furosemide  20 mg Intravenous Daily     Continuous Infusions:    esmolol Stopped (18)    sodium chloride 75 mL/hr at 18 0445    dextrose       PRN Meds: hydrocodone-chlorpheniramine, acetaminophen, magnesium hydroxide, nicotine, ondansetron, sodium chloride flush, glucose, dextrose, glucagon (rDNA), dextrose, dextromethorphan-guaiFENesin    Data:     Past Medical History:   has a past medical history of CAD (coronary artery disease); Cardiac arrest ([facility] Hyperlipidemia; and Hypertension. Social History:   reports that he has been smoking Cigarettes. He has never used smokeless tobacco. He reports that he drinks alcohol. He reports that he does not use drugs. Family History:   Family History   Problem Relation Age of Onset    Diabetes Mother     High Blood Pressure Mother     Heart Disease Mother     Coronary Art Dis Mother     Other Father         MVA       Vitals:  BP (!) 113/49   Pulse 69   Temp 98 °F (36.7 °C)   Resp 16   Ht 6' (1.829 m)   Wt 285 lb 12.8 oz (129.6 kg)   SpO2 97%   BMI 38.76 kg/m²   Temp (24hrs), Av.6 °F (36.4 °C), Min:97.3 °F (36.3 °C), Max:98 °F (36.7 °C)    Recent Labs      18   1243  18   1700  18   2121  18   0854   POCGLU  203*  303*  186*  152*       I/O (24Hr):     Intake/Output Summary (Last 24 hours) at 18 0908  Last data filed at 18 1859   Gross per 24 hour   Intake              720 ml   Output              625 ml   Net               95 ml       Labs:    Hematology:  Recent Labs      18   0631   WBC  20.8*   RBC  4.42   HGB  13.2   HCT  40.8   MCV  92.3   MCH  29.9   MCHC  32.4   RDW  13.2   PLT  268   MPV  10.0     Chemistry:  Recent Labs      18   0618  18   0806  18   1153  18   0631   NA   --   137   -- 136   K   --   4.2   --   4.4   CL   --   104   --   102   CO2   --   20   --   21   GLUCOSE   --   245*   --   258*   BUN   --   13   --   11   CREATININE   --   1.03   --   0.84   MG   --   2.5   --    --    ANIONGAP   --   13   --   13   LABGLOM   --   >60   --   >60   GFRAA   --   >60   --   >60   CALCIUM   --   8.3*   --   8.5*   CAION   --   1.12*   --    --    PHOS   --   2.2*   --    --    PROBNP   --   657*   --    --    TROPONINT  <0.03   --   <0.03   --      Recent Labs      11/16/18   0806   11/16/18   2343  11/17/18   0631  11/17/18   0709  11/17/18   1243  11/17/18   1700  11/17/18   2121  11/18/18   0854   PROT  6.8   --    --   6.8   --    --    --    --    --    LABALBU  2.9*   --    --   3.0*   --    --    --    --    --    LABA1C  6.9*   --    --    --    --    --    --    --    --    TSH  1.41   --    --    --    --    --    --    --    --    AST  14   --    --   22   --    --    --    --    --    ALT  20   --    --   33   --    --    --    --    --    ALKPHOS  69   --    --   80   --    --    --    --    --    BILITOT  0.22*   --    --   <0.10*   --    --    --    --    --    POCGLU   --    < >  367*   --   226*  203*  303*  186*  152*    < > = values in this interval not displayed.          Lab Results   Component Value Date/Time    SPECIAL LT FORAMR .5 ML 11/16/2018 06:36 AM     Lab Results   Component Value Date/Time    CULTURE NO GROWTH 2 DAYS 11/16/2018 06:36 AM       Lab Results   Component Value Date    FIO2 NOT REPORTED 06/11/2017       Radiology:    Chest x-ray with no focal airspace disease  Nuclear stress test with infarct of the apex, no reversible ischemia, ejection fraction 48%    Physical Examination:        General appearance:  alert, cooperative and no distress  Mental Status:  oriented to person, place and time and normal affect  Lungs:  Scattered rhonchi bilaterally, normal effort, distant breath sounds  Heart:  regular rate and rhythm, no murmur  Abdomen:  soft, nontender,

## 2018-11-19 VITALS
HEIGHT: 72 IN | DIASTOLIC BLOOD PRESSURE: 70 MMHG | BODY MASS INDEX: 38.71 KG/M2 | RESPIRATION RATE: 18 BRPM | TEMPERATURE: 97.3 F | SYSTOLIC BLOOD PRESSURE: 123 MMHG | OXYGEN SATURATION: 95 % | WEIGHT: 285.8 LBS | HEART RATE: 76 BPM

## 2018-11-19 LAB
ABSOLUTE EOS #: 0.29 K/UL (ref 0–0.4)
ABSOLUTE IMMATURE GRANULOCYTE: 0.29 K/UL (ref 0–0.3)
ABSOLUTE LYMPH #: 2.57 K/UL (ref 1–4.8)
ABSOLUTE MONO #: 1.29 K/UL (ref 0.1–0.8)
ANION GAP SERPL CALCULATED.3IONS-SCNC: 11 MMOL/L (ref 9–17)
BASOPHILS # BLD: 0 % (ref 0–2)
BASOPHILS ABSOLUTE: 0 K/UL (ref 0–0.2)
BUN BLDV-MCNC: 13 MG/DL (ref 6–20)
BUN/CREAT BLD: ABNORMAL (ref 9–20)
CALCIUM SERPL-MCNC: 8.7 MG/DL (ref 8.6–10.4)
CHLORIDE BLD-SCNC: 100 MMOL/L (ref 98–107)
CO2: 26 MMOL/L (ref 20–31)
CREAT SERPL-MCNC: 0.83 MG/DL (ref 0.7–1.2)
DIFFERENTIAL TYPE: ABNORMAL
EOSINOPHILS RELATIVE PERCENT: 2 % (ref 1–4)
GFR AFRICAN AMERICAN: >60 ML/MIN
GFR NON-AFRICAN AMERICAN: >60 ML/MIN
GFR SERPL CREATININE-BSD FRML MDRD: ABNORMAL ML/MIN/{1.73_M2}
GFR SERPL CREATININE-BSD FRML MDRD: ABNORMAL ML/MIN/{1.73_M2}
GLUCOSE BLD-MCNC: 156 MG/DL (ref 75–110)
GLUCOSE BLD-MCNC: 165 MG/DL (ref 70–99)
GLUCOSE BLD-MCNC: 215 MG/DL (ref 75–110)
HCT VFR BLD CALC: 42.6 % (ref 40.7–50.3)
HEMOGLOBIN: 13.8 G/DL (ref 13–17)
IMMATURE GRANULOCYTES: 2 %
LYMPHOCYTES # BLD: 18 % (ref 24–44)
MAGNESIUM: 1.9 MG/DL (ref 1.6–2.6)
MCH RBC QN AUTO: 28.7 PG (ref 25.2–33.5)
MCHC RBC AUTO-ENTMCNC: 32.4 G/DL (ref 28.4–34.8)
MCV RBC AUTO: 88.6 FL (ref 82.6–102.9)
MONOCYTES # BLD: 9 % (ref 1–7)
MORPHOLOGY: NORMAL
NRBC AUTOMATED: 0 PER 100 WBC
PDW BLD-RTO: 13.2 % (ref 11.8–14.4)
PLATELET # BLD: 304 K/UL (ref 138–453)
PLATELET ESTIMATE: ABNORMAL
PMV BLD AUTO: 9.5 FL (ref 8.1–13.5)
POTASSIUM SERPL-SCNC: 3.5 MMOL/L (ref 3.7–5.3)
RBC # BLD: 4.81 M/UL (ref 4.21–5.77)
RBC # BLD: ABNORMAL 10*6/UL
SEG NEUTROPHILS: 69 % (ref 36–66)
SEGMENTED NEUTROPHILS ABSOLUTE COUNT: 9.86 K/UL (ref 1.8–7.7)
SODIUM BLD-SCNC: 137 MMOL/L (ref 135–144)
WBC # BLD: 14.3 K/UL (ref 3.5–11.3)
WBC # BLD: ABNORMAL 10*3/UL

## 2018-11-19 PROCEDURE — 83735 ASSAY OF MAGNESIUM: CPT

## 2018-11-19 PROCEDURE — 6360000002 HC RX W HCPCS: Performed by: INTERNAL MEDICINE

## 2018-11-19 PROCEDURE — 6370000000 HC RX 637 (ALT 250 FOR IP): Performed by: NURSE PRACTITIONER

## 2018-11-19 PROCEDURE — 85025 COMPLETE CBC W/AUTO DIFF WBC: CPT

## 2018-11-19 PROCEDURE — 99239 HOSP IP/OBS DSCHRG MGMT >30: CPT | Performed by: INTERNAL MEDICINE

## 2018-11-19 PROCEDURE — 80048 BASIC METABOLIC PNL TOTAL CA: CPT

## 2018-11-19 PROCEDURE — 6370000000 HC RX 637 (ALT 250 FOR IP): Performed by: INTERNAL MEDICINE

## 2018-11-19 PROCEDURE — 2580000003 HC RX 258: Performed by: NURSE PRACTITIONER

## 2018-11-19 PROCEDURE — 82947 ASSAY GLUCOSE BLOOD QUANT: CPT

## 2018-11-19 PROCEDURE — 6360000002 HC RX W HCPCS: Performed by: NURSE PRACTITIONER

## 2018-11-19 PROCEDURE — 94761 N-INVAS EAR/PLS OXIMETRY MLT: CPT

## 2018-11-19 PROCEDURE — 36415 COLL VENOUS BLD VENIPUNCTURE: CPT

## 2018-11-19 RX ORDER — ONDANSETRON 4 MG/1
4 TABLET, FILM COATED ORAL EVERY 8 HOURS PRN
Qty: 20 TABLET | Refills: 0 | Status: SHIPPED | OUTPATIENT
Start: 2018-11-19

## 2018-11-19 RX ORDER — WARFARIN SODIUM 5 MG/1
5 TABLET ORAL DAILY
Qty: 30 TABLET | Refills: 3 | Status: SHIPPED | OUTPATIENT
Start: 2018-11-19

## 2018-11-19 RX ORDER — ATORVASTATIN CALCIUM 80 MG/1
80 TABLET, FILM COATED ORAL NIGHTLY
Qty: 30 TABLET | Refills: 3 | Status: SHIPPED | OUTPATIENT
Start: 2018-11-19

## 2018-11-19 RX ORDER — GUAIFENESIN DEXTROMETHORPHAN HYDROBROMIDE ORAL SOLUTION 10; 100 MG/5ML; MG/5ML
10 SOLUTION ORAL EVERY 4 HOURS PRN
Qty: 1 BOTTLE | Refills: 0 | Status: SHIPPED | OUTPATIENT
Start: 2018-11-19

## 2018-11-19 RX ORDER — CLOPIDOGREL BISULFATE 75 MG/1
75 TABLET ORAL DAILY
Qty: 30 TABLET | Refills: 3 | Status: SHIPPED | OUTPATIENT
Start: 2018-11-19

## 2018-11-19 RX ORDER — NAPROXEN 500 MG/1
500 TABLET ORAL 2 TIMES DAILY
Qty: 20 TABLET | Refills: 0 | Status: SHIPPED | OUTPATIENT
Start: 2018-11-19 | End: 2018-11-19 | Stop reason: HOSPADM

## 2018-11-19 RX ORDER — SODIUM CHLORIDE/ALOE VERA
GEL (GRAM) NASAL PRN
Status: DISCONTINUED | OUTPATIENT
Start: 2018-11-19 | End: 2018-11-19 | Stop reason: HOSPADM

## 2018-11-19 RX ORDER — WARFARIN SODIUM 5 MG/1
5 TABLET ORAL DAILY
Status: DISCONTINUED | OUTPATIENT
Start: 2018-11-19 | End: 2018-11-19

## 2018-11-19 RX ORDER — AZITHROMYCIN 500 MG/1
500 TABLET, FILM COATED ORAL EVERY 24 HOURS
Qty: 5 TABLET | Refills: 0 | Status: SHIPPED | OUTPATIENT
Start: 2018-11-20 | End: 2018-11-25

## 2018-11-19 RX ORDER — CARVEDILOL 6.25 MG/1
6.25 TABLET ORAL 2 TIMES DAILY WITH MEALS
Qty: 60 TABLET | Refills: 3 | Status: SHIPPED | OUTPATIENT
Start: 2018-11-19

## 2018-11-19 RX ORDER — WARFARIN SODIUM 5 MG/1
5 TABLET ORAL DAILY
Status: DISCONTINUED | OUTPATIENT
Start: 2018-11-19 | End: 2018-11-19 | Stop reason: HOSPADM

## 2018-11-19 RX ORDER — ASPIRIN 81 MG/1
81 TABLET, CHEWABLE ORAL DAILY
Qty: 30 TABLET | Refills: 3 | Status: SHIPPED | OUTPATIENT
Start: 2018-11-19

## 2018-11-19 RX ORDER — LISINOPRIL 5 MG/1
2.5 TABLET ORAL DAILY
Qty: 30 TABLET | Refills: 3 | Status: SHIPPED | OUTPATIENT
Start: 2018-11-19

## 2018-11-19 RX ORDER — NITROGLYCERIN 0.4 MG/1
TABLET SUBLINGUAL
Qty: 25 TABLET | Refills: 3 | Status: SHIPPED | OUTPATIENT
Start: 2018-11-19

## 2018-11-19 RX ADMIN — INSULIN LISPRO 2 UNITS: 100 INJECTION, SOLUTION INTRAVENOUS; SUBCUTANEOUS at 08:05

## 2018-11-19 RX ADMIN — METFORMIN HYDROCHLORIDE 500 MG: 500 TABLET ORAL at 08:06

## 2018-11-19 RX ADMIN — INSULIN LISPRO 4 UNITS: 100 INJECTION, SOLUTION INTRAVENOUS; SUBCUTANEOUS at 14:05

## 2018-11-19 RX ADMIN — Medication 5 ML: at 00:29

## 2018-11-19 RX ADMIN — FUROSEMIDE 20 MG: 10 INJECTION, SOLUTION INTRAMUSCULAR; INTRAVENOUS at 08:06

## 2018-11-19 RX ADMIN — AZITHROMYCIN 500 MG: 250 TABLET, FILM COATED ORAL at 05:21

## 2018-11-19 RX ADMIN — CEFTRIAXONE SODIUM 1 G: 1 INJECTION, POWDER, FOR SOLUTION INTRAMUSCULAR; INTRAVENOUS at 05:22

## 2018-11-19 RX ADMIN — ASPIRIN 81 MG: 81 TABLET, CHEWABLE ORAL at 08:07

## 2018-11-19 RX ADMIN — Medication 10 ML: at 07:30

## 2018-11-19 RX ADMIN — Medication 10 ML: at 08:08

## 2018-11-19 ASSESSMENT — PAIN SCALES - GENERAL
PAINLEVEL_OUTOF10: 0

## 2018-11-19 NOTE — PROGRESS NOTES
Home Oxygen Evaluation    Home Oxygen Evaluation completed. Patient is on 2 liters per minute via nasal cannula.   Resting SpO2 = 96%  Resting SpO2 on room air = 87%    SpO2 on room air with exercise = n/a  SpO2 on oxygen as above with exercise = n/a      Keith Menjivar  2:36 PM

## 2018-11-19 NOTE — CARE COORDINATION
Transition planning home pcp f/u already made. meds to beds no prescription coverage. Ps dr Vincent Russell med assist   14:16 spoke to dr Bay Bowen will start coumadin consult pharmacy for education on coumadin and diabetic ed for diabetes. Tested for home o2 qualified ps dr Bay Bowen for orders    15:16 met with patient to discuss dc needs discussed   starting coumadin said he didn't want to discuss it patient qualified for o2 he  said not to work on it that he is leaving and can't wait. 15:56 Faxed o2 orders and documents to [facility]. Called [facility] coumadin clinic closed left vm with patients contact info. Contact info for coumadin clinic in follow up's. gave patient order's for diabetic ed, diabetic supplies, pt/inr, coumadin clinic referral encouraged him to call tomorrow to schedule appt.

## 2018-11-19 NOTE — PROGRESS NOTES
Estela Johnson 19    Progress Note    11/19/2018    8:18 AM    Name:   Jason Odonnell  MRN:     [id number]     Acct:      [de-identified]   Room:   [address] [place] [address] Day:  3  Admit Date:  11/16/2018  3:34 AM    PCP:   No primary care provider on file. Code Status:  Full Code    Subjective:     C/C: Cough, shortness of breath, new onset atrial fibrillation    Interval History Status: not changed. Patient states he is doing \"terrible\" today with continued cough and posttussive emesis. He is less short of breath, denies any chest pain, fevers or chills. Brief History: This is a 19-year-old white male who presented to the emergency room with a complaint shortness of breath and cough and was found have right lower lobe pneumonia as well as rapid atrial fibrillation. He's been admitted to the hospital for IV antibiotics, aerosols, anticoagulation for his A. fib as well as rate control. He has mild chest pain with the symptoms and he has a history of coronary artery disease and cardiology is recommended stress testing. Review of Systems:     Constitutional:  negative for chills, fevers, sweats  Respiratory:  negative for cough, dyspnea on exertion, hemoptysis, shortness of breath, wheezing  Cardiovascular:  negative for chest pain, chest pressure/discomfort, lower extremity edema, palpitations  Gastrointestinal:  negative for abdominal pain, constipation, diarrhea, nausea, vomiting  Neurological:  negative for dizziness, headache    Medications:      Allergies:  No Known Allergies    Current Meds:   Scheduled Meds:    rivaroxaban  20 mg Oral Daily    azithromycin  500 mg Oral Q24H    sodium chloride flush  10 mL Intravenous BID    insulin lispro  0-12 Units Subcutaneous TID WC    insulin lispro  0-6 Units Subcutaneous Nightly    metFORMIN  500 mg Oral BID WC    polyethylene glycol  17 g Oral Daily    cefTRIAXone (ROCEPHIN) IV  1 g

## 2018-11-20 ENCOUNTER — TELEPHONE (OUTPATIENT)
Dept: PULMONOLOGY | Age: 48
End: 2018-11-20

## 2018-11-20 NOTE — TELEPHONE ENCOUNTER
----- Message from Jackie Berumen MD sent at 11/19/2018 10:26 PM EST -----  Please have the  Pt see me for kelle in the next few weeks. ty  ----- Message -----  From: Bal Hansen MD  Sent: 11/19/2018   6:48 PM  To:  Jackie Berumen MD

## 2018-11-20 NOTE — DISCHARGE SUMMARY
Estela John Melville 19    Discharge Summary     Patient ID: Debby Choi  :  1970   MRN: [id number]     ACCOUNT:  [de-identified]   Patient's PCP: No primary care provider on file. Admit Date: 2018   Discharge Date: 2018    Length of Stay: 3  Code Status:  Prior  Admitting Physician: Concepción Sibley DO  Discharge Physician: Twin Tinsley MD     Active Discharge Diagnoses:     Hospital Problem Lists:  Principal Problem:    Lobar pneumonia [facility])  Active Problems:    Cocaine abuse ([facility]    Coronary artery disease involving native coronary artery    New onset atrial fibrillation ([facility]    Essential hypertension    Hyperlipidemia    Hyperglycemia    New onset type 2 diabetes mellitus ([facility]    Acute hypoxemic respiratory failure (HCC)    Snores    Left ventricular systolic dysfunction  Resolved Problems:    * No resolved hospital problems. *      Admission Condition:  poor    Discharged Condition: fair    Hospital Stay:     Hospital Course:  Debby Choi is a 50 y.o. male who was admitted for the management of   Lobar pneumonia ([facility] , presented to ER with shortness of breath. This is a 80-year-old white male who presented to the emergency room with a complaint shortness of breath and cough and was found have right lower lobe pneumonia as well as rapid atrial fibrillation.  He's been admitted to the hospital for IV antibiotics, aerosols, anticoagulation for his A. fib as well as rate control.  He has mild chest pain with the symptoms and he has a history of coronary artery disease and cardiology is recommended stress testing. Significant therapeutic interventions:     1. Azithromycin on discharge  2. Okay for DC from cardio standpoint  3. Counseled on cocaine cessation  4. For new onset diabetes, to get diabetic education as outpatient. Referral placed. Diabetic supplies on discharge. 5. Aerosols as ordered  6.  Oxygen as needed, home oxygen evaluation prior to discharge  7. Outpatient sleep study  8. OF NOTE: Patient was initially prescribed Xarelto, however it waqs brought to my attention by CECILY King that patient did not have prescription coverage. As such, coumadin consult was placed and dose of coumadin was ordered. Meds were reconciled and INR was ordered for 2 days from now, with f/u at coumadin clinic. Patient was anxious to leave and ended up leaving with Xarelto prescription, without taking dose of coumadin. Called [facility] to help coordinate outpatient management. Patient will need to be transitioned to coumadin with goal INR of 2-3. Have not received call back. Significant Diagnostic Studies:   Labs / Micro:  CBC:   Lab Results   Component Value Date    WBC 14.3 11/19/2018    RBC 4.81 11/19/2018    HGB 13.8 11/19/2018    HCT 42.6 11/19/2018    MCV 88.6 11/19/2018    MCH 28.7 11/19/2018    MCHC 32.4 11/19/2018    RDW 13.2 11/19/2018     11/19/2018     BMP:    Lab Results   Component Value Date    GLUCOSE 165 11/19/2018     11/19/2018    K 3.5 11/19/2018     11/19/2018    CO2 26 11/19/2018    ANIONGAP 11 11/19/2018    BUN 13 11/19/2018    CREATININE 0.83 11/19/2018    BUNCRER NOT REPORTED 11/19/2018    CALCIUM 8.7 11/19/2018    LABGLOM >60 11/19/2018    GFRAA >60 11/19/2018    GFR      11/19/2018    GFR NOT REPORTED 11/19/2018       Radiology:    Xr Chest Portable    Result Date: 11/18/2018  EXAMINATION: SINGLE XRAY VIEW OF THE CHEST 11/18/2018 10:34 am COMPARISON: 02/22/2007 HISTORY: ORDERING SYSTEM PROVIDED HISTORY: pneumonia TECHNOLOGIST PROVIDED HISTORY: pneumonia 70-year-old male with pneumonia FINDINGS: Portable upright view of the chest. Cardiac monitor leads overlie the chest. Cardiac and mediastinal contours unchanged. Stable mild cardiomegaly. Trachea midline. No pneumothorax. No free air. No mediastinal shift. No acute focal airspace consolidation or pleural effusions. definitive scintigraphic evidence for reversible ischemia. 3. Left ventricular ejection fraction of 48%. Slightly enlarged left ventricular size. 4.  Please see report for EKG portion of the examination which will be performed separately by physician from cardiology. Risk stratification:  Intermediate risk Note:  Risk stratification incorporates both clinical history and test results. Final risk determination is the responsibility of the ordering provider as history and other test results may increase or decrease the risk stratification reported for this examination. Risk stratification criteria are adapted from \"Noninvasive Risk Stratification\" criteria from Marlena Skelton. Al, ACC/AATS/AHA/ASE/ASNC/SCAI/SCCT/STS 2017 Appropriate Use Criteria For Coronary Revascularization in Patients With Stable Ischemic Heart Disease [facility] Volume 69, Issue 17, May 2017 High risk (>3% annual death or MI) 1. Severe resting LV dysfunction (LVEF >35%) not readily explained by non coronary causes 2. Resting perfusion abnormalities greater than 10% of the myocardium in patients without prior history or evidence of MI 3. Stress-induced perfusion abnormalities encumbering greater than or equal to 10% myocardium or stress segmental scores indicating multiple vascular territories with abnormalities 4. Stress-induced LV dilatation (TID ratio greater than 1.19 for exercise and greater than 1.39 for regadenoson) Intermediate risk (1% to 3% annual death or MI) 1. Mild/moderate resting LV dysfunction (LVEF 35% to 49%) not readily explained by non coronary causes. 2.  Resting perfusion abnormalities in 5%-9.9% of the myocardium in patients without a history or prior evidence of MI 3. Stress-induced perfusion abnormality encumbering 5%-9.9% of the myocardium or stress segmental scores indicating 1 vascular territory with abnormalities but without LV dilation 4. Small wall motion abnormality involving 1-2 segments and only 1 coronary bed.  Low

## 2018-11-22 LAB
CULTURE: NORMAL
CULTURE: NORMAL
Lab: NORMAL
Lab: NORMAL
SPECIMEN DESCRIPTION: NORMAL
SPECIMEN DESCRIPTION: NORMAL
STATUS: NORMAL
STATUS: NORMAL

## 2018-11-23 ENCOUNTER — TELEPHONE (OUTPATIENT)
Dept: INTERNAL MEDICINE CLINIC | Age: 48
End: 2018-11-23

## 2020-12-06 ENCOUNTER — HOSPITAL ENCOUNTER (EMERGENCY)
Age: 50
Discharge: HOME | End: 2020-12-06
Attending: EMERGENCY MEDICINE

## 2020-12-06 VITALS
HEART RATE: 86 BPM | BODY MASS INDEX: 37.93 KG/M2 | DIASTOLIC BLOOD PRESSURE: 75 MMHG | HEIGHT: 72 IN | RESPIRATION RATE: 14 BRPM | WEIGHT: 280 LBS | SYSTOLIC BLOOD PRESSURE: 156 MMHG | OXYGEN SATURATION: 97 % | TEMPERATURE: 98.1 F

## 2020-12-06 PROCEDURE — 99285 EMERGENCY DEPT VISIT HI MDM: CPT

## 2020-12-06 PROCEDURE — 6370000000 HC RX 637 (ALT 250 FOR IP): Performed by: EMERGENCY MEDICINE

## 2020-12-06 RX ORDER — HYDROCODONE BITARTRATE AND ACETAMINOPHEN 5; 325 MG/1; MG/1
2 TABLET ORAL ONCE
Status: COMPLETED | OUTPATIENT
Start: 2020-12-06 | End: 2020-12-06

## 2020-12-06 RX ORDER — CEPHALEXIN 500 MG/1
500 CAPSULE ORAL 4 TIMES DAILY
Qty: 28 CAPSULE | Refills: 0 | Status: SHIPPED | OUTPATIENT
Start: 2020-12-06 | End: 2020-12-13

## 2020-12-06 RX ORDER — CEPHALEXIN 250 MG/1
500 CAPSULE ORAL ONCE
Status: COMPLETED | OUTPATIENT
Start: 2020-12-06 | End: 2020-12-06

## 2020-12-06 RX ADMIN — CEPHALEXIN 500 MG: 250 CAPSULE ORAL at 12:05

## 2020-12-06 RX ADMIN — HYDROCODONE BITARTRATE AND ACETAMINOPHEN 2 TABLET: 5; 325 TABLET ORAL at 12:05

## 2020-12-06 ASSESSMENT — PAIN SCALES - GENERAL
PAINLEVEL_OUTOF10: 10
PAINLEVEL_OUTOF10: 10

## 2020-12-06 ASSESSMENT — PAIN DESCRIPTION - PAIN TYPE: TYPE: ACUTE PAIN

## 2020-12-06 ASSESSMENT — PAIN DESCRIPTION - DESCRIPTORS: DESCRIPTORS: ACHING;SORE

## 2020-12-06 ASSESSMENT — PAIN DESCRIPTION - LOCATION: LOCATION: LEG

## 2020-12-06 ASSESSMENT — PAIN DESCRIPTION - ORIENTATION: ORIENTATION: RIGHT;UPPER

## 2020-12-06 NOTE — ED PROVIDER NOTES
EMERGENCY DEPARTMENT ENCOUNTER    Pt Name: Ming Molina  MRN: [id number]  [place] 1970  Date of evaluation: 12/11/20  CHIEF COMPLAINT       Chief Complaint   Patient presents with    Cellulitis     right upper leg     HISTORY OF PRESENT ILLNESS   HPI  50M hx of HTN, CAD presents with spreading redness, warmth, tenderness of the skin of hir R medial thigh. Pt states started a few days ago, slowly spreading. Denies involvement of groin, no pain in knee or hip, denies fever/chills. Denies injury or irritation to the area but has had similar redness to that area in the past.    REVIEW OF SYSTEMS     Review of Systems   All other systems reviewed and are negative. PASTMEDICAL HISTORY     Past Medical History:   Diagnosis Date    CAD (coronary artery disease)     [facility] 06/11/2017    Hyperlipidemia     Hypertension      SURGICAL HISTORY       Past Surgical History:   Procedure Laterality Date    CARDIAC CATHETERIZATION      1 stent    ENDOSCOPY, COLON, DIAGNOSTIC      EGD    KNEE ARTHROSCOPY Right     at age 13 or 12     CURRENT MEDICATIONS       Discharge Medication List as of 12/6/2020 12:10 PM      CONTINUE these medications which have NOT CHANGED    Details   aspirin 81 MG chewable tablet Take 1 tablet by mouth daily, Disp-30 tablet, R-3Normal      nitroGLYCERIN (NITROSTAT) 0.4 MG SL tablet up to max of 3 total doses.  If no relief after 1 dose, call 911., Disp-25 tablet, R-3Normal      metFORMIN (GLUCOPHAGE) 500 MG tablet Take 1 tablet by mouth 2 times daily (with meals), Disp-60 tablet, R-3Normal      ondansetron (ZOFRAN) 4 MG tablet Take 1 tablet by mouth every 8 hours as needed for Nausea, Disp-20 tablet, R-0Normal      atorvastatin (LIPITOR) 80 MG tablet Take 1 tablet by mouth nightly, Disp-30 tablet, R-3Normal      lisinopril (PRINIVIL;ZESTRIL) 5 MG tablet Take 0.5 tablets by mouth daily, Disp-30 tablet, R-3Normal      carvedilol (COREG) 6.25 MG tablet Take 1 tablet by mouth 2 times daily (with meals), Disp-60 tablet, R-3Normal      dextromethorphan-guaiFENesin (ROBITUSSIN-DM)  MG/5ML syrup Take 10 mLs by mouth every 4 hours as needed for Cough, Disp-1 Bottle, R-0Normal      clopidogrel (PLAVIX) 75 MG tablet Take 1 tablet by mouth daily, Disp-30 tablet, R-3Normal      warfarin (COUMADIN) 5 MG tablet Take 1 tablet by mouth daily, Disp-30 tablet, R-3Normal           ALLERGIES     has No Known Allergies. FAMILY HISTORY     He indicated that his mother is . He indicated that his father is . He indicated that his sister is alive. SOCIAL HISTORY       Social History     Tobacco Use    Smoking status: Current Every Day Smoker     Types: Cigarettes    Smokeless tobacco: Never Used   Substance Use Topics    Alcohol use: Yes     Comment: occasionally    Drug use: No     Types: Cocaine     Comment: last use 2017     PHYSICAL EXAM     INITIAL VITALS: BP (!) 156/75   Pulse 86   Temp 98.1 °F (36.7 °C)   Resp 14   Ht 6' (1.829 m)   Wt 280 lb (127 kg)   SpO2 97%   BMI 37.97 kg/m²    Physical Exam  Constitutional:       General: He is not in acute distress. Appearance: Normal appearance. He is normal weight. He is not ill-appearing. HENT:      Head: Normocephalic and atraumatic. Nose: Nose normal. No rhinorrhea. Mouth/Throat:      Mouth: Mucous membranes are moist.      Pharynx: No oropharyngeal exudate or posterior oropharyngeal erythema. Eyes:      Extraocular Movements: Extraocular movements intact. Conjunctiva/sclera: Conjunctivae normal.      Pupils: Pupils are equal, round, and reactive to light. Neck:      Musculoskeletal: No neck rigidity. Cardiovascular:      Rate and Rhythm: Normal rate and regular rhythm. Pulses: Normal pulses. Heart sounds: Normal heart sounds. No murmur. Pulmonary:      Effort: Pulmonary effort is normal. No respiratory distress. Breath sounds: Normal breath sounds. No wheezing or rhonchi. Abdominal:      General: Bowel sounds are normal.      Palpations: Abdomen is soft. There is no mass. Tenderness: There is no abdominal tenderness. There is no right CVA tenderness, left CVA tenderness, guarding or rebound. Musculoskeletal: Normal range of motion. Skin:     General: Skin is warm and dry. Capillary Refill: Capillary refill takes less than 2 seconds. Comments: Large area of blanching erythema with warmth in medial R thigh, some tenderness to this area, no wounds, no vesicles, no skin sloughing, no extension into groin or knee   Neurological:      General: No focal deficit present. Mental Status: He is alert and oriented to person, place, and time. Psychiatric:         Mood and Affect: Mood normal.         Thought Content: Thought content normal.         MEDICAL DECISION MAKING:     - extent of rash demarcated with surgical marker, pt told to monitor for extension past line  - norco  - keflex    DIAGNOSTIC RESULTS   EKG:All EKG's are interpreted by the Emergency Department Physician who either signs or Co-signs this chart in the absence of a cardiologist.        RADIOLOGY:All plain film, CT, MRI, and formal ultrasound images (except ED bedside ultrasound) are read by the radiologist, see reports below, unless otherwisenoted in MDM or here. No orders to display     LABS: All lab results were reviewed by myself, and all abnormals are listed below. Labs Reviewed - No data to display    EMERGENCY DEPARTMENTCOURSE:     Pt seems to have cellulitis of his R medial thigh, given course of keflex, return precautions, instructed to follow up with PCP.     Vitals:    Vitals:    12/06/20 1049   BP: (!) 156/75   Pulse: 86   Resp: 14   Temp: 98.1 °F (36.7 °C)   SpO2: 97%   Weight: 280 lb (127 kg)   Height: 6' (1.829 m)       The patient was given the following medications while in the emergency department:  Orders Placed This Encounter   Medications    cephALEXin (KEFLEX) capsule 500 mg Order Specific Question:   Antimicrobial Indications     Answer:   Skin and Soft Tissue Infection    HYDROcodone-acetaminophen (NORCO) 5-325 MG per tablet 2 tablet    cephALEXin (KEFLEX) 500 MG capsule     Sig: Take 1 capsule by mouth 4 times daily for 7 days     Dispense:  28 capsule     Refill:  0     CONSULTS:  None    FINAL IMPRESSION      1. Cellulitis of right thigh          DISPOSITION/PLAN   DISPOSITION Decision To Discharge 12/06/2020 12:08:34 PM      PATIENT REFERRED TO:  Primary care doctor  Make an appointment to follow up with your primary care doctor.         DISCHARGE MEDICATIONS:  Discharge Medication List as of 12/6/2020 12:10 PM      START taking these medications    Details   cephALEXin (KEFLEX) 500 MG capsule Take 1 capsule by mouth 4 times daily for 7 days, Disp-28 capsule,R-0Print           Graham Coleman MD  Attending Emergency Physician                    Graham Coleman MD  12/11/20 [id number]

## 2023-08-25 ENCOUNTER — HOSPITAL ENCOUNTER (OUTPATIENT)
Age: 53
Setting detail: SPECIMEN
Discharge: HOME | End: 2023-08-25

## 2023-08-26 ENCOUNTER — HOSPITAL ENCOUNTER (OUTPATIENT)
Age: 53
Setting detail: SPECIMEN
Discharge: HOME | End: 2023-08-26

## 2023-08-26 LAB
ALBUMIN SERPL-MCNC: 4.2 G/DL (ref 3.5–5.2)
ALP SERPL-CCNC: 70 U/L (ref 40–129)
ALT SERPL-CCNC: 31 U/L (ref 5–41)
ANION GAP SERPL CALCULATED.3IONS-SCNC: 12 MMOL/L (ref 9–17)
AST SERPL-CCNC: 23 U/L
BILIRUB SERPL-MCNC: 0.3 MG/DL (ref 0.3–1.2)
BUN SERPL-MCNC: 20 MG/DL (ref 6–20)
BUN/CREAT SERPL: 25 (ref 9–20)
CALCIUM SERPL-MCNC: 9.3 MG/DL (ref 8.6–10.4)
CHLORIDE SERPL-SCNC: 100 MMOL/L (ref 98–107)
CO2 SERPL-SCNC: 26 MMOL/L (ref 20–31)
CREAT SERPL-MCNC: 0.8 MG/DL (ref 0.7–1.2)
ERYTHROCYTE [DISTWIDTH] IN BLOOD BY AUTOMATED COUNT: 13.3 % (ref 11.8–14.4)
GFR SERPL CREATININE-BSD FRML MDRD: >60 ML/MIN/1.73M2
GLUCOSE SERPL-MCNC: 159 MG/DL (ref 70–99)
HCT VFR BLD AUTO: 41.5 % (ref 40.7–50.3)
HGB BLD-MCNC: 13.1 G/DL (ref 13–17)
MCH RBC QN AUTO: 28.7 PG (ref 25.2–33.5)
MCHC RBC AUTO-ENTMCNC: 31.6 G/DL (ref 28.4–34.8)
MCV RBC AUTO: 91 FL (ref 82.6–102.9)
NRBC BLD-RTO: 0 PER 100 WBC
PLATELET # BLD AUTO: 285 K/UL (ref 138–453)
PMV BLD AUTO: 9.2 FL (ref 8.1–13.5)
POTASSIUM SERPL-SCNC: 4 MMOL/L (ref 3.7–5.3)
PROT SERPL-MCNC: 7 G/DL (ref 6.4–8.3)
RBC # BLD AUTO: 4.56 M/UL (ref 4.21–5.77)
SODIUM SERPL-SCNC: 138 MMOL/L (ref 135–144)
VANCOMYCIN TROUGH SERPL-MCNC: 16.6 UG/ML (ref 10–20)
WBC OTHER # BLD: 10.7 K/UL (ref 3.5–11.3)

## 2023-08-26 PROCEDURE — 80202 ASSAY OF VANCOMYCIN: CPT

## 2023-08-26 PROCEDURE — 80053 COMPREHEN METABOLIC PANEL: CPT

## 2023-08-26 PROCEDURE — P9603 ONE-WAY ALLOW PRORATED MILES: HCPCS

## 2023-08-26 PROCEDURE — 85027 COMPLETE CBC AUTOMATED: CPT

## 2023-08-26 PROCEDURE — 36415 COLL VENOUS BLD VENIPUNCTURE: CPT

## 2023-08-29 ENCOUNTER — HOSPITAL ENCOUNTER (OUTPATIENT)
Age: 53
Setting detail: SPECIMEN
Discharge: HOME | End: 2023-08-29

## 2023-08-29 LAB
ALBUMIN SERPL-MCNC: 4 G/DL (ref 3.5–5.2)
ALP SERPL-CCNC: 76 U/L (ref 40–129)
ALT SERPL-CCNC: 19 U/L (ref 5–41)
ANION GAP SERPL CALCULATED.3IONS-SCNC: 12 MMOL/L (ref 9–17)
AST SERPL-CCNC: 11 U/L
BILIRUB SERPL-MCNC: 0.2 MG/DL (ref 0.3–1.2)
BUN SERPL-MCNC: 19 MG/DL (ref 6–20)
BUN/CREAT SERPL: 32 (ref 9–20)
CALCIUM SERPL-MCNC: 9.5 MG/DL (ref 8.6–10.4)
CHLORIDE SERPL-SCNC: 106 MMOL/L (ref 98–107)
CO2 SERPL-SCNC: 20 MMOL/L (ref 20–31)
CREAT SERPL-MCNC: 0.6 MG/DL (ref 0.7–1.2)
ERYTHROCYTE [DISTWIDTH] IN BLOOD BY AUTOMATED COUNT: 13.2 % (ref 11.8–14.4)
GFR SERPL CREATININE-BSD FRML MDRD: >60 ML/MIN/1.73M2
GLUCOSE SERPL-MCNC: 164 MG/DL (ref 70–99)
HCT VFR BLD AUTO: 37.9 % (ref 40.7–50.3)
HGB BLD-MCNC: 12.1 G/DL (ref 13–17)
MCH RBC QN AUTO: 28.7 PG (ref 25.2–33.5)
MCHC RBC AUTO-ENTMCNC: 31.9 G/DL (ref 28.4–34.8)
MCV RBC AUTO: 90 FL (ref 82.6–102.9)
NRBC BLD-RTO: 0 PER 100 WBC
PLATELET # BLD AUTO: 253 K/UL (ref 138–453)
PMV BLD AUTO: 9.2 FL (ref 8.1–13.5)
POTASSIUM SERPL-SCNC: 4.2 MMOL/L (ref 3.7–5.3)
PROT SERPL-MCNC: 6.6 G/DL (ref 6.4–8.3)
RBC # BLD AUTO: 4.21 M/UL (ref 4.21–5.77)
SODIUM SERPL-SCNC: 138 MMOL/L (ref 135–144)
VANCOMYCIN TROUGH SERPL-MCNC: 11.3 UG/ML (ref 10–20)
WBC OTHER # BLD: 9.8 K/UL (ref 3.5–11.3)

## 2023-08-29 PROCEDURE — P9603 ONE-WAY ALLOW PRORATED MILES: HCPCS

## 2023-08-29 PROCEDURE — 36415 COLL VENOUS BLD VENIPUNCTURE: CPT

## 2023-08-29 PROCEDURE — 80202 ASSAY OF VANCOMYCIN: CPT

## 2023-08-29 PROCEDURE — 80053 COMPREHEN METABOLIC PANEL: CPT

## 2023-08-29 PROCEDURE — 85027 COMPLETE CBC AUTOMATED: CPT

## 2023-09-01 ENCOUNTER — APPOINTMENT (OUTPATIENT)
Dept: VASCULAR LAB | Age: 53
End: 2023-09-01
Attending: EMERGENCY MEDICINE

## 2023-09-01 ENCOUNTER — APPOINTMENT (OUTPATIENT)
Dept: CT IMAGING | Age: 53
End: 2023-09-01

## 2023-09-01 ENCOUNTER — HOSPITAL ENCOUNTER (EMERGENCY)
Age: 53
Discharge: LEFT BEFORE DISCHARGE | End: 2023-09-01
Attending: EMERGENCY MEDICINE

## 2023-09-01 ENCOUNTER — APPOINTMENT (OUTPATIENT)
Dept: GENERAL RADIOLOGY | Age: 53
End: 2023-09-01

## 2023-09-01 VITALS
RESPIRATION RATE: 16 BRPM | BODY MASS INDEX: 29.8 KG/M2 | DIASTOLIC BLOOD PRESSURE: 86 MMHG | TEMPERATURE: 98.4 F | HEART RATE: 92 BPM | OXYGEN SATURATION: 96 % | WEIGHT: 220 LBS | HEIGHT: 72 IN | SYSTOLIC BLOOD PRESSURE: 160 MMHG

## 2023-09-01 DIAGNOSIS — I82.A12 ACUTE DEEP VEIN THROMBOSIS (DVT) OF AXILLARY VEIN OF LEFT UPPER EXTREMITY (HCC): Primary | ICD-10-CM

## 2023-09-01 DIAGNOSIS — M79.602 LEFT ARM PAIN: ICD-10-CM

## 2023-09-01 LAB
ALBUMIN SERPL-MCNC: 3.8 G/DL (ref 3.5–5.2)
ALP SERPL-CCNC: 81 U/L (ref 40–129)
ALT SERPL-CCNC: 14 U/L (ref 5–41)
ANION GAP SERPL CALCULATED.3IONS-SCNC: 12 MMOL/L (ref 9–17)
AST SERPL-CCNC: 11 U/L
BASOPHILS # BLD: 0.1 K/UL (ref 0–0.2)
BASOPHILS NFR BLD: 1 % (ref 0–2)
BILIRUB SERPL-MCNC: <0.2 MG/DL (ref 0.3–1.2)
BUN SERPL-MCNC: 14 MG/DL (ref 6–20)
CALCIUM SERPL-MCNC: 9 MG/DL (ref 8.6–10.4)
CHLORIDE SERPL-SCNC: 101 MMOL/L (ref 98–107)
CO2 SERPL-SCNC: 21 MMOL/L (ref 20–31)
CREAT SERPL-MCNC: 0.6 MG/DL (ref 0.7–1.2)
ECHO BSA: 2.25 M2
EOSINOPHIL # BLD: 0.6 K/UL (ref 0–0.4)
EOSINOPHILS RELATIVE PERCENT: 5 % (ref 0–4)
ERYTHROCYTE [DISTWIDTH] IN BLOOD BY AUTOMATED COUNT: 14.1 % (ref 11.5–14.9)
GFR SERPL CREATININE-BSD FRML MDRD: >60 ML/MIN/1.73M2
GLUCOSE SERPL-MCNC: 239 MG/DL (ref 70–99)
HCT VFR BLD AUTO: 35.9 % (ref 41–53)
HGB BLD-MCNC: 11.7 G/DL (ref 13.5–17.5)
INR PPP: 0.9
LACTATE BLDV-SCNC: 1.2 MMOL/L (ref 0.5–1.9)
LACTATE BLDV-SCNC: 2.4 MMOL/L (ref 0.5–1.9)
LIPASE SERPL-CCNC: 34 U/L (ref 13–60)
LYMPHOCYTES NFR BLD: 2.1 K/UL (ref 1–4.8)
LYMPHOCYTES RELATIVE PERCENT: 18 % (ref 24–44)
MAGNESIUM SERPL-MCNC: 1.5 MG/DL (ref 1.6–2.6)
MCH RBC QN AUTO: 28.6 PG (ref 26–34)
MCHC RBC AUTO-ENTMCNC: 32.5 G/DL (ref 31–37)
MCV RBC AUTO: 87.8 FL (ref 80–100)
MONOCYTES NFR BLD: 1.3 K/UL (ref 0.1–1.3)
MONOCYTES NFR BLD: 11 % (ref 1–7)
NEUTROPHILS NFR BLD: 65 % (ref 36–66)
NEUTS SEG NFR BLD: 7.9 K/UL (ref 1.3–9.1)
PLATELET # BLD AUTO: 270 K/UL (ref 150–450)
PMV BLD AUTO: 6.9 FL (ref 6–12)
POTASSIUM SERPL-SCNC: 3.6 MMOL/L (ref 3.7–5.3)
PROCALCITONIN SERPL-MCNC: 0.11 NG/ML
PROT SERPL-MCNC: 6.5 G/DL (ref 6.4–8.3)
PROTHROMBIN TIME: 12.3 SEC (ref 11.8–14.6)
RBC # BLD AUTO: 4.08 M/UL (ref 4.5–5.9)
SODIUM SERPL-SCNC: 134 MMOL/L (ref 135–144)
TROPONIN I SERPL HS-MCNC: 9 NG/L (ref 0–22)
WBC OTHER # BLD: 11.9 K/UL (ref 3.5–11)

## 2023-09-01 PROCEDURE — 93971 EXTREMITY STUDY: CPT | Performed by: SURGERY

## 2023-09-01 PROCEDURE — 71045 X-RAY EXAM CHEST 1 VIEW: CPT

## 2023-09-01 PROCEDURE — 84145 PROCALCITONIN (PCT): CPT

## 2023-09-01 PROCEDURE — 83735 ASSAY OF MAGNESIUM: CPT

## 2023-09-01 PROCEDURE — 93971 EXTREMITY STUDY: CPT

## 2023-09-01 PROCEDURE — 87040 BLOOD CULTURE FOR BACTERIA: CPT

## 2023-09-01 PROCEDURE — 80053 COMPREHEN METABOLIC PANEL: CPT

## 2023-09-01 PROCEDURE — 83690 ASSAY OF LIPASE: CPT

## 2023-09-01 PROCEDURE — 85025 COMPLETE CBC W/AUTO DIFF WBC: CPT

## 2023-09-01 PROCEDURE — 85610 PROTHROMBIN TIME: CPT

## 2023-09-01 PROCEDURE — 93005 ELECTROCARDIOGRAM TRACING: CPT | Performed by: EMERGENCY MEDICINE

## 2023-09-01 PROCEDURE — 84484 ASSAY OF TROPONIN QUANT: CPT

## 2023-09-01 PROCEDURE — 70450 CT HEAD/BRAIN W/O DYE: CPT

## 2023-09-01 PROCEDURE — 2580000003 HC RX 258: Performed by: EMERGENCY MEDICINE

## 2023-09-01 PROCEDURE — 99285 EMERGENCY DEPT VISIT HI MDM: CPT

## 2023-09-01 PROCEDURE — 83605 ASSAY OF LACTIC ACID: CPT

## 2023-09-01 PROCEDURE — 36415 COLL VENOUS BLD VENIPUNCTURE: CPT

## 2023-09-01 RX ORDER — 0.9 % SODIUM CHLORIDE 0.9 %
1000 INTRAVENOUS SOLUTION INTRAVENOUS ONCE
Status: COMPLETED | OUTPATIENT
Start: 2023-09-01 | End: 2023-09-01

## 2023-09-01 RX ADMIN — SODIUM CHLORIDE 1000 ML: 9 INJECTION, SOLUTION INTRAVENOUS at 05:21

## 2023-09-01 ASSESSMENT — PAIN DESCRIPTION - DESCRIPTORS: DESCRIPTORS: SHOOTING;NUMBNESS

## 2023-09-01 ASSESSMENT — PAIN DESCRIPTION - LOCATION: LOCATION: ARM

## 2023-09-01 ASSESSMENT — ENCOUNTER SYMPTOMS
SHORTNESS OF BREATH: 0
EYE PAIN: 0
ABDOMINAL PAIN: 0
COLOR CHANGE: 0
BACK PAIN: 0

## 2023-09-01 ASSESSMENT — LIFESTYLE VARIABLES
HOW MANY STANDARD DRINKS CONTAINING ALCOHOL DO YOU HAVE ON A TYPICAL DAY: PATIENT DOES NOT DRINK
HOW OFTEN DO YOU HAVE A DRINK CONTAINING ALCOHOL: NEVER

## 2023-09-01 ASSESSMENT — PAIN SCALES - GENERAL: PAINLEVEL_OUTOF10: 7

## 2023-09-01 ASSESSMENT — PAIN DESCRIPTION - PAIN TYPE: TYPE: ACUTE PAIN

## 2023-09-01 ASSESSMENT — PAIN - FUNCTIONAL ASSESSMENT: PAIN_FUNCTIONAL_ASSESSMENT: 0-10

## 2023-09-01 ASSESSMENT — PAIN DESCRIPTION - ORIENTATION: ORIENTATION: LEFT

## 2023-09-01 NOTE — ED PROVIDER NOTES
is refusing all further workup and testing. The patient has normal mental status and adequate capacity to make medical decisions and has the capacity to make decisions. The patient refuses hospital admission and wants to be discharged. The risks have been explained to the patient, including worsening illness, chronic pain, permanent disability, loss of organs, and death. The benefits of further testing and admission have also been explained, including the availability and proximity of nurses, physicians, monitoring, diagnostic testing, and treatment. The patient was able to understand and state the risks and benefits of hospital admission. This was witnessed by the nurse and me. The patient had the opportunity to ask questions about medical conditions. The patient was treated to the extent that the patient allowed, and knows that may return for care at any time. Patient left prior to being able to provide him with instructions or paperwork.      Cristiano Benavidez DO  09/01/23 6526

## 2023-09-01 NOTE — ED NOTES
Patient refusing transfer to [facility] at bedside with Dr. Charli Dill who repeatedly informed patient that he would need to be monitored closely in the hospital in order to start anticoagulation. Patient continues to refuse hospital admission. Patient refused to sign AMA form. Patient walks out of ED independently.       Emily Diaz RN  09/01/23 [id number]

## 2023-09-01 NOTE — ED PROVIDER NOTES
person, place, and time. Mental status is at baseline. GCS: GCS eye subscore is 4. GCS verbal subscore is 5. GCS motor subscore is 6. Cranial Nerves: Cranial nerves 2-12 are intact. No cranial nerve deficit, dysarthria or facial asymmetry. Sensory: Sensation is intact. No sensory deficit. Motor: Motor function is intact. Comments: Noted to have some weakness in the bilateral lower extremities which is chronic per patient   Psychiatric:         Behavior: Behavior normal.         Thought Content: Thought content does not include homicidal or suicidal ideation.        MEDICAL DECISION MAKING / ED COURSE:   60-year-old male presents for concern for PICC line malfunction, on initial exam patient in no acute distress, vital stable      1)  Number and Complexity of Problems Addressed at this Encounter  Problem List This Visit: Arm swelling    Differential Diagnosis: PICC infection, DVT    Diagnoses Considered but Do Not Suspect: Necrotizing fasciitis, worsening of brain abscess    Pertinent Comorbid Conditions: Recent admission for intracranial abscess    2)  Data Reviewed and Analyzed  (Lab and radiology tests/orders below in next section)        My EKG interpretation: Sinus rhythm rate of 88, normal axis, normal intervals, no ST segment elevation or depression nonspecific T wave changes      3)  Treatment and Disposition           We will check labs, ultrasound    Labs are reviewed, ultrasound was obtained showing acute left axillary DVT    Discussed with neurosurgery at [facility] who would like to get a repeat CT scan as long as there is no active bleeding patient can be started on full dose anticoagulation for DVT, does not feel that patient would require transfer at this time    Patient was signed out to the oncoming provider Dr. Radha Vanegas, pending completion of CT and final disposition however given the findings of a upper extremity DVT, planning for admission      CRITICAL CARE: PROCALCITONIN - Abnormal; Notable for the following components:    Procalcitonin 0.11 (*)     All other components within normal limits   CULTURE, BLOOD 1   CULTURE, BLOOD 2   LIPASE   PROTIME-INR   TROPONIN   LACTATE, SEPSIS       Vitals Reviewed:    Vitals:    09/01/23 0430 09/01/23 0602 09/01/23 0745 09/01/23 0806   BP:  (!) 150/94 (!) 160/86    Pulse: 95 93 92 92   Resp:  26 16    Temp:       TempSrc:       SpO2:  96%     Weight:       Height:         MEDICATIONS GIVEN TO PATIENT THIS ENCOUNTER:  Orders Placed This Encounter   Medications    sodium chloride 0.9 % bolus 1,000 mL     DISCHARGE PRESCRIPTIONS:  New Prescriptions    No medications on file     PHYSICIAN CONSULTS ORDERED THIS ENCOUNTER:  IP CONSULT TO NEUROSURGERY  FINAL IMPRESSION      1. Left arm pain    2. Acute deep vein thrombosis (DVT) of axillary vein of left upper extremity (HCC)          DISPOSITION/PLAN   DISPOSITION        OUTPATIENT FOLLOW UP THE PATIENT:  No follow-up provider specified.     DO Seda Lee DO  09/01/23 1051

## 2023-09-02 LAB
EKG ATRIAL RATE: 88 BPM
EKG P AXIS: 36 DEGREES
EKG P-R INTERVAL: 150 MS
EKG Q-T INTERVAL: 364 MS
EKG QRS DURATION: 84 MS
EKG QTC CALCULATION (BAZETT): 440 MS
EKG R AXIS: 14 DEGREES
EKG T AXIS: -3 DEGREES
EKG VENTRICULAR RATE: 88 BPM

## 2023-09-02 PROCEDURE — 93010 ELECTROCARDIOGRAM REPORT: CPT | Performed by: INTERNAL MEDICINE

## 2023-09-03 LAB
MICROORGANISM SPEC CULT: NORMAL
MICROORGANISM SPEC CULT: NORMAL
SERVICE CMNT-IMP: NORMAL
SERVICE CMNT-IMP: NORMAL
SPECIMEN DESCRIPTION: NORMAL
SPECIMEN DESCRIPTION: NORMAL
